# Patient Record
Sex: MALE | Race: WHITE | Employment: FULL TIME | URBAN - METROPOLITAN AREA
[De-identification: names, ages, dates, MRNs, and addresses within clinical notes are randomized per-mention and may not be internally consistent; named-entity substitution may affect disease eponyms.]

---

## 2017-07-30 ENCOUNTER — APPOINTMENT (OUTPATIENT)
Dept: CT IMAGING | Age: 42
End: 2017-07-30
Attending: EMERGENCY MEDICINE
Payer: SELF-PAY

## 2017-07-30 ENCOUNTER — HOSPITAL ENCOUNTER (EMERGENCY)
Age: 42
Discharge: HOME | End: 2017-07-30
Attending: EMERGENCY MEDICINE
Payer: SELF-PAY

## 2017-07-30 VITALS
SYSTOLIC BLOOD PRESSURE: 138 MMHG | BODY MASS INDEX: 24.11 KG/M2 | RESPIRATION RATE: 18 BRPM | WEIGHT: 150 LBS | OXYGEN SATURATION: 98 % | HEIGHT: 66 IN | HEART RATE: 70 BPM | DIASTOLIC BLOOD PRESSURE: 64 MMHG | TEMPERATURE: 97.6 F

## 2017-07-30 DIAGNOSIS — S00.83XA FACIAL CONTUSION, INITIAL ENCOUNTER: ICD-10-CM
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
## 2017-07-30 DIAGNOSIS — S16.1XXA NECK STRAIN, INITIAL ENCOUNTER: ICD-10-CM

## 2017-07-30 DIAGNOSIS — S01.81XA FACIAL LACERATION, INITIAL ENCOUNTER: ICD-10-CM

## 2017-07-30 PROCEDURE — 70486 CT MAXILLOFACIAL W/O DYE: CPT

## 2017-07-30 PROCEDURE — 99284 EMERGENCY DEPT VISIT MOD MDM: CPT | Performed by: EMERGENCY MEDICINE

## 2017-07-30 PROCEDURE — 72125 CT NECK SPINE W/O DYE: CPT

## 2017-07-30 PROCEDURE — 74011250637 HC RX REV CODE- 250/637: Performed by: EMERGENCY MEDICINE

## 2017-07-30 PROCEDURE — 70450 CT HEAD/BRAIN W/O DYE: CPT

## 2017-07-30 RX ORDER — ONDANSETRON 8 MG/1
8 TABLET, ORALLY DISINTEGRATING ORAL
Status: COMPLETED | OUTPATIENT
Start: 2017-07-30 | End: 2017-07-30

## 2017-07-30 RX ORDER — CYCLOBENZAPRINE HCL 10 MG
10 TABLET ORAL
Qty: 21 TAB | Refills: 0 | Status: SHIPPED | OUTPATIENT
Start: 2017-07-30 | End: 2017-09-22

## 2017-07-30 RX ORDER — HYDROCODONE BITARTRATE AND ACETAMINOPHEN 5; 325 MG/1; MG/1
1 TABLET ORAL
Qty: 13 TAB | Refills: 0 | Status: SHIPPED | OUTPATIENT
Start: 2017-07-30 | End: 2017-09-22

## 2017-07-30 RX ORDER — OXYCODONE AND ACETAMINOPHEN 5; 325 MG/1; MG/1
1 TABLET ORAL
Status: COMPLETED | OUTPATIENT
Start: 2017-07-30 | End: 2017-07-30

## 2017-07-30 RX ADMIN — OXYCODONE HYDROCHLORIDE AND ACETAMINOPHEN 1 TABLET: 5; 325 TABLET ORAL at 09:11

## 2017-07-30 RX ADMIN — ONDANSETRON 8 MG: 8 TABLET, ORALLY DISINTEGRATING ORAL at 09:49
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
## 2017-07-30 NOTE — DISCHARGE INSTRUCTIONS
Neck Strain: Care Instructions  Your Care Instructions  You have strained the muscles and ligaments in your neck. A sudden, awkward movement can strain the neck. This often occurs with falls or car accidents or during certain sports. Everyday activities like working on a computer or sleeping can also cause neck strain if they force you to hold your neck in an awkward position for a long time. It is common for neck pain to get worse for a day or two after an injury, but it should start to feel better after that. You may have more pain and stiffness for several days before it gets better. This is expected. It may take a few weeks or longer for it to heal completely. Good home treatment can help you get better faster and avoid future neck problems. Follow-up care is a key part of your treatment and safety. Be sure to make and go to all appointments, and call your doctor if you are having problems. It's also a good idea to know your test results and keep a list of the medicines you take. How can you care for yourself at home? · If you were given a neck brace (cervical collar) to limit neck motion, wear it as instructed for as many days as your doctor tells you to. Do not wear it longer than you were told to. Wearing a brace for too long can make neck stiffness worse and weaken the neck muscles. · You can try using heat or ice to see if it helps. ¨ Try using a heating pad on a low or medium setting for 15 to 20 minutes every 2 to 3 hours. Try a warm shower in place of one session with the heating pad. You can also buy single-use heat wraps that last up to 8 hours. ¨ You can also try an ice pack for 10 to 15 minutes every 2 to 3 hours. · Take pain medicines exactly as directed. ¨ If the doctor gave you a prescription medicine for pain, take it as prescribed. ¨ If you are not taking a prescription pain medicine, ask your doctor if you can take an over-the-counter medicine.   · Gently rub the area to relieve pain and help with blood flow. Do not massage the area if it hurts to do so. · Do not do anything that makes the pain worse. Take it easy for a couple of days. You can do your usual activities if they do not hurt your neck or put it at risk for more stress or injury. · Try sleeping on a special neck pillow. Place it under your neck, not under your head. Placing a tightly rolled-up towel under your neck while you sleep will also work. If you use a neck pillow or rolled towel, do not use your regular pillow at the same time. · To prevent future neck pain, do exercises to stretch and strengthen your neck and back. Learn how to use good posture, safe lifting techniques, and proper body mechanics. When should you call for help? Call 911 anytime you think you may need emergency care. For example, call if:  · You are unable to move an arm or a leg at all. Call your doctor now or seek immediate medical care if:  · You have new or worse symptoms in your arms, legs, chest, belly, or buttocks. Symptoms may include:  ¨ Numbness or tingling. ¨ Weakness. ¨ Pain. · You lose bladder or bowel control. Watch closely for changes in your health, and be sure to contact your doctor if:  · You are not getting better as expected. Where can you learn more? Go to http://jama-norma.info/. Enter M253 in the search box to learn more about \"Neck Strain: Care Instructions. \"  Current as of: March 21, 2017  Content Version: 11.3  © 4029-0095 [employer]. Care instructions adapted under license by [employer] (which disclaims liability or warranty for this information). If you have questions about a medical condition or this instruction, always ask your healthcare professional. [place] [address] any warranty or liability for your use of this information. Head Injury: Care Instructions  Your Care Instructions  Most injuries to the head are minor.  Bumps, cuts, and scrapes on the head and face usually heal well and can be treated the same as injuries to other parts of the body. Although it's rare, once in a while a more serious problem shows up after you are home. So it's good to be on the lookout for symptoms for a day or two. Follow-up care is a key part of your treatment and safety. Be sure to make and go to all appointments, and call your doctor if you are having problems. It's also a good idea to know your test results and keep a list of the medicines you take. How can you care for yourself at home? · Follow your doctor's instructions. He or she will tell you if you need someone to watch you closely for the next 24 hours or longer. · Take it easy for the next few days or more if you are not feeling well. · Ask your doctor when it's okay for you to go back to activities like driving a car, riding a bike, or operating machinery. When should you call for help? Call 911 anytime you think you may need emergency care. For example, call if:  · You have a seizure. · You passed out (lost consciousness). · You are confused or can't stay awake. Call your doctor now or seek immediate medical care if:  · You have new or worse vomiting. · You feel less alert. · You have new weakness or numbness in any part of your body. Watch closely for changes in your health, and be sure to contact your doctor if:  · You do not get better as expected. · You have new symptoms, such as headaches, trouble concentrating, or changes in mood. Where can you learn more? Go to http://jama-norma.info/. Enter F309 in the search box to learn more about \"Head Injury: Care Instructions. \"  Current as of: October 14, 2016  Content Version: 11.3  © 2275-3061 [employer]. Care instructions adapted under license by [employer] (which disclaims liability or warranty for this information).  If you have questions about a medical condition or this instruction, always ask your healthcare professional. [place] [address] any warranty or liability for your use of this information. Cuts: Care Instructions  Your Care Instructions  A cut can happen anywhere on your body. Stitches, staples, skin adhesives, or pieces of tape called Steri-Strips are sometimes used to keep the edges of a cut together and help it heal. Steri-Strips can be used by themselves or with stitches or staples. Sometimes cuts are left open. If the cut went deep and through the skin, the doctor may have closed the cut in two layers. A deeper layer of stitches brings the deep part of the cut together. These stitches will dissolve and don't need to be removed. The upper layer closure, which could be stitches, staples, Steri-Strips, or adhesive, is what you see on the cut. A cut is often covered by a bandage. The doctor has checked you carefully, but problems can develop later. If you notice any problems or new symptoms, get medical treatment right away. Follow-up care is a key part of your treatment and safety. Be sure to make and go to all appointments, and call your doctor if you are having problems. It's also a good idea to know your test results and keep a list of the medicines you take. How can you care for yourself at home? If a cut is open or closed  · Prop up the sore area on a pillow anytime you sit or lie down during the next 3 days. Try to keep it above the level of your heart. This will help reduce swelling. · Keep the cut dry for the first 24 to 48 hours. After this, you can shower if your doctor okays it. Pat the cut dry. · Don't soak the cut, such as in a bathtub. Your doctor will tell you when it's safe to get the cut wet. · After the first 24 to 48 hours, clean the cut with soap and water 2 times a day unless your doctor gives you different instructions. ¨ Don't use hydrogen peroxide or alcohol, which can slow healing.   ¨ You may cover the cut with a thin layer of petroleum jelly and a nonstick bandage. ¨ If the doctor put a bandage over the cut, put on a new bandage after cleaning the cut or if the bandage gets wet or dirty. · Avoid any activity that could cause your cut to reopen. · Be safe with medicines. Read and follow all instructions on the label. ¨ If the doctor gave you a prescription medicine for pain, take it as prescribed. ¨ If you are not taking a prescription pain medicine, ask your doctor if you can take an over-the-counter medicine. If the cut is closed with stitches, staples, or Steri-Strips  · Follow the above instructions for open or closed cuts. · Do not remove the stitches or staples on your own. Your doctor will tell you when to come back to have the stitches or staples removed. · Leave Steri-Strips on until they fall off. If the cut is closed with a skin adhesive  · Follow the above instructions for open or closed cuts. · Leave the skin adhesive on your skin until it falls off on its own. This may take 5 to 10 days. · Do not scratch, rub, or pick at the adhesive. · Do not put the sticky part of a bandage directly on the adhesive. · Do not put any kind of ointment, cream, or lotion over the area. This can make the adhesive fall off too soon. Do not use hydrogen peroxide or alcohol, which can slow healing. When should you call for help? Call your doctor now or seek immediate medical care if:  · You have new pain, or your pain gets worse. · The skin near the cut is cold or pale or changes color. · You have tingling, weakness, or numbness near the cut. · The cut starts to bleed, and blood soaks through the bandage. Oozing small amounts of blood is normal.  · You have trouble moving the area near the cut. · You have symptoms of infection, such as:  ¨ Increased pain, swelling, warmth, or redness around the cut. ¨ Red streaks leading from the cut. ¨ Pus draining from the cut. ¨ A fever.   Watch closely for changes in your health, and be sure to contact your doctor if:  · The cut reopens. · You do not get better as expected. Where can you learn more? Go to http://jama-norma.info/. Enter M735 in the search box to learn more about \"Cuts: Care Instructions. \"  Current as of: March 20, 2017  Content Version: 11.3  © 4777-0899 [employer]. Care instructions adapted under license by [email or web address] (which disclaims liability or warranty for this information). If you have questions about a medical condition or this instruction, always ask your healthcare professional. [address] any warranty or liability for your use of this information. Skin Tears: Care Instructions  Your Care Instructions  As we get older, our skin gets drier and more fragile. Sometimes this can cause the outer layers of skin to split and tear open. Skin tears are treated in different ways. In some cases, doctors use pieces of tape called Steri-Strips to pull the skin together and help it heal. Other times, it's best to leave the tear open and cover it with a special wound-care bandage. Skin tears are usually not serious. They usually heal in a few weeks. But how long you take to heal depends on your body and the type of tear you have. Sometimes the torn piece of skin is used to protect the wound while it heals. But that piece of skin does not heal. It may fall off on its own. Or the doctor may remove it. As your tear heals, it's important to keep it clean to help prevent infection. The doctor has checked you carefully, but problems can develop later. If you notice any problems or new symptoms, get medical treatment right away. Follow-up care is a key part of your treatment and safety. Be sure to make and go to all appointments, and call your doctor if you are having problems. It's also a good idea to know your test results and keep a list of the medicines you take.   How can you care for yourself at home?  · If you have pain, ask your doctor if you can take an over-the-counter pain medicine, such as acetaminophen (Tylenol), ibuprofen (Advil, Motrin), or naproxen (Aleve). Be safe with medicines. Read and follow all instructions on the label. · If you have a bandage, follow your doctor's instructions for changing it. · If you have Steri-Strips, leave them on until they fall off. · Follow your doctor's instructions about bathing. · Gently wash the skin tear with plain water 2 times a day. Do not rub the area. · Let the area air dry. Or you can pat it carefully with a soft towel. When should you call for help? Call your doctor now or seek immediate medical care if:  · You have signs of infection, such as:  ¨ Increased pain, swelling, warmth, or redness around the tear. ¨ Red streaks leading from the tear. ¨ Pus draining from the tear. ¨ A fever. · The tear starts to bleed a lot. Small amounts of blood are normal.  Watch closely for changes in your health, and be sure to contact your doctor if:  · You do not get better as expected. Where can you learn more? Go to http://jama-norma.info/. Enter F000 in the search box to learn more about \"Skin Tears: Care Instructions. \"  Current as of: March 20, 2017  Content Version: 11.3  © 2735-1986 [employer]. Care instructions adapted under license by [employer] (which disclaims liability or warranty for this information). If you have questions about a medical condition or this instruction, always ask your healthcare professional. [place] [address] any warranty or liability for your use of this information.
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
## 2017-07-30 NOTE — ED NOTES
Discharge instructions given verbally and in written form. Pt verbalized understanding of instructions and prescription given. Pt left the ER ambulatory.

## 2017-08-02 ENCOUNTER — HOSPITAL ENCOUNTER (EMERGENCY)
Age: 42
Discharge: LEFT BEFORE DISCHARGE | End: 2017-08-02
Attending: EMERGENCY MEDICINE
Payer: SELF-PAY

## 2017-08-02 VITALS
TEMPERATURE: 98.8 F | DIASTOLIC BLOOD PRESSURE: 74 MMHG | OXYGEN SATURATION: 98 % | WEIGHT: 160 LBS | HEART RATE: 99 BPM | HEIGHT: 66 IN | BODY MASS INDEX: 25.71 KG/M2 | SYSTOLIC BLOOD PRESSURE: 116 MMHG | RESPIRATION RATE: 17 BRPM

## 2017-08-02 PROCEDURE — 75810000275 HC EMERGENCY DEPT VISIT NO LEVEL OF CARE: Performed by: EMERGENCY MEDICINE

## 2017-09-22 PROCEDURE — 99283 EMERGENCY DEPT VISIT LOW MDM: CPT | Performed by: EMERGENCY MEDICINE

## 2017-09-23 ENCOUNTER — HOSPITAL ENCOUNTER (EMERGENCY)
Age: 42
Discharge: HOME | End: 2017-09-23
Attending: EMERGENCY MEDICINE
Payer: SELF-PAY

## 2017-09-23 VITALS
WEIGHT: 180 LBS | TEMPERATURE: 98.3 F | HEART RATE: 80 BPM | DIASTOLIC BLOOD PRESSURE: 93 MMHG | RESPIRATION RATE: 14 BRPM | OXYGEN SATURATION: 96 % | BODY MASS INDEX: 28.93 KG/M2 | HEIGHT: 66 IN | SYSTOLIC BLOOD PRESSURE: 144 MMHG

## 2017-09-23 DIAGNOSIS — G44.219 EPISODIC TENSION-TYPE HEADACHE, NOT INTRACTABLE: Primary | ICD-10-CM

## 2017-09-23 DIAGNOSIS — R11.0 NAUSEA ALONE: ICD-10-CM

## 2017-09-23 DIAGNOSIS — F41.1 ANXIETY STATE: ICD-10-CM

## 2017-09-23 DIAGNOSIS — R42 DIZZINESS: ICD-10-CM

## 2017-09-23 RX ORDER — MECLIZINE HYDROCHLORIDE 25 MG/1
25 TABLET ORAL
Qty: 15 TAB | Refills: 0 | Status: SHIPPED | OUTPATIENT
Start: 2017-09-23 | End: 2017-10-03

## 2017-09-23 RX ORDER — GABAPENTIN 300 MG/1
CAPSULE ORAL
Qty: 90 CAP | Refills: 1 | Status: SHIPPED | OUTPATIENT
Start: 2017-09-23

## 2017-09-23 RX ORDER — PROCHLORPERAZINE MALEATE 10 MG
10 TABLET ORAL
Qty: 8 TAB | Refills: 1 | Status: SHIPPED | OUTPATIENT
Start: 2017-09-23

## 2017-09-23 RX ORDER — BUTALBITAL, ACETAMINOPHEN AND CAFFEINE 300; 40; 50 MG/1; MG/1; MG/1
1-2 CAPSULE ORAL
Qty: 20 CAP | Refills: 0 | Status: SHIPPED | OUTPATIENT
Start: 2017-09-23

## 2017-09-23 NOTE — ED TRIAGE NOTES
Pt c/o pain in skull behind left ear. He also has c/o extreme anxiety. Pt states he has been seen in multiple  EDs for these same complaints recently. Pt a&o with no signs of distress during triage.
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
## 2017-09-23 NOTE — DISCHARGE INSTRUCTIONS
Dizziness: Care Instructions  Your Care Instructions  Dizziness is the feeling of unsteadiness or fuzziness in your head. It is different than having vertigo, which is a feeling that the room is spinning or that you are moving or falling. It is also different from lightheadedness, which is the feeling that you are about to faint. It can be hard to know what causes dizziness. Some people feel dizzy when they have migraine headaches. Sometimes bouts of flu can make you feel dizzy. Some medical conditions, such as heart problems or high blood pressure, can make you feel dizzy. Many medicines can cause dizziness, including medicines for high blood pressure, pain, or anxiety. If a medicine causes your symptoms, your doctor may recommend that you stop or change the medicine. If it is a problem with your heart, you may need medicine to help your heart work better. If there is no clear reason for your symptoms, your doctor may suggest watching and waiting for a while to see if the dizziness goes away on its own. Follow-up care is a key part of your treatment and safety. Be sure to make and go to all appointments, and call your doctor if you are having problems. It's also a good idea to know your test results and keep a list of the medicines you take. How can you care for yourself at home? · If your doctor recommends or prescribes medicine, take it exactly as directed. Call your doctor if you think you are having a problem with your medicine. · Do not drive while you feel dizzy. · Try to prevent falls. Steps you can take include:  ¨ Using nonskid mats, adding grab bars near the tub, and using night-lights. ¨ Clearing your home so that walkways are free of anything you might trip on. ¨ Letting family and friends know that you have been feeling dizzy. This will help them know how to help you. When should you call for help? Call 911 anytime you think you may need emergency care.  For example, call if:  · You passed out (lost consciousness). · You have dizziness along with symptoms of a heart attack. These may include:  ¨ Chest pain or pressure, or a strange feeling in the chest.  ¨ Sweating. ¨ Shortness of breath. ¨ Nausea or vomiting. ¨ Pain, pressure, or a strange feeling in the back, neck, jaw, or upper belly or in one or both shoulders or arms. ¨ Lightheadedness or sudden weakness. ¨ A fast or irregular heartbeat. · You have symptoms of a stroke. These may include:  ¨ Sudden numbness, tingling, weakness, or loss of movement in your face, arm, or leg, especially on only one side of your body. ¨ Sudden vision changes. ¨ Sudden trouble speaking. ¨ Sudden confusion or trouble understanding simple statements. ¨ Sudden problems with walking or balance. ¨ A sudden, severe headache that is different from past headaches. Call your doctor now or seek immediate medical care if:  · You feel dizzy and have a fever, headache, or ringing in your ears. · You have new or increased nausea and vomiting. · Your dizziness does not go away or comes back. Watch closely for changes in your health, and be sure to contact your doctor if:  · You do not get better as expected. Where can you learn more? Go to http://jama-norma.info/. Enter P904 in the search box to learn more about \"Dizziness: Care Instructions. \"  Current as of: March 20, 2017  Content Version: 11.3  © 4802-1009 [employer]. Care instructions adapted under license by [employer] (which disclaims liability or warranty for this information). If you have questions about a medical condition or this instruction, always ask your healthcare professional. [place] [address] any warranty or liability for your use of this information. Anxiety Disorder: Care Instructions  Your Care Instructions  Anxiety is a normal reaction to stress.  Difficult situations can cause you to have symptoms such as sweaty palms and a nervous feeling. In an anxiety disorder, the symptoms are far more severe. Constant worry, muscle tension, trouble sleeping, nausea and diarrhea, and other symptoms can make normal daily activities difficult or impossible. These symptoms may occur for no reason, and they can affect your work, school, or social life. Medicines, counseling, and self-care can all help. Follow-up care is a key part of your treatment and safety. Be sure to make and go to all appointments, and call your doctor if you are having problems. It's also a good idea to know your test results and keep a list of the medicines you take. How can you care for yourself at home? · Take medicines exactly as directed. Call your doctor if you think you are having a problem with your medicine. · Go to your counseling sessions and follow-up appointments. · Recognize and accept your anxiety. Then, when you are in a situation that makes you anxious, say to yourself, \"This is not an emergency. I feel uncomfortable, but I am not in danger. I can keep going even if I feel anxious. \"  · Be kind to your body:  ¨ Relieve tension with exercise or a massage. ¨ Get enough rest.  ¨ Avoid alcohol, caffeine, nicotine, and illegal drugs. They can increase your anxiety level and cause sleep problems. ¨ Learn and do relaxation techniques. See below for more about these techniques. · Engage your mind. Get out and do something you enjoy. Go to a funny movie, or take a walk or hike. Plan your day. Having too much or too little to do can make you anxious. · Keep a record of your symptoms. Discuss your fears with a good friend or family member, or join a support group for people with similar problems. Talking to others sometimes relieves stress. · Get involved in social groups, or volunteer to help others. Being alone sometimes makes things seem worse than they are. · Get at least 30 minutes of exercise on most days of the week to relieve stress.  Walking is a good choice. You also may want to do other activities, such as running, swimming, cycling, or playing tennis or team sports. Relaxation techniques  Do relaxation exercises 10 to 20 minutes a day. You can play soothing, relaxing music while you do them, if you wish. · Tell others in your house that you are going to do your relaxation exercises. Ask them not to disturb you. · Find a comfortable place, away from all distractions and noise. · Lie down on your back, or sit with your back straight. · Focus on your breathing. Make it slow and steady. · Breathe in through your nose. Breathe out through either your nose or mouth. · Breathe deeply, filling up the area between your navel and your rib cage. Breathe so that your belly goes up and down. · Do not hold your breath. · Breathe like this for 5 to 10 minutes. Notice the feeling of calmness throughout your whole body. As you continue to breathe slowly and deeply, relax by doing the following for another 5 to 10 minutes:  · Tighten and relax each muscle group in your body. You can begin at your toes and work your way up to your head. · Imagine your muscle groups relaxing and becoming heavy. · Empty your mind of all thoughts. · Let yourself relax more and more deeply. · Become aware of the state of calmness that surrounds you. · When your relaxation time is over, you can bring yourself back to alertness by moving your fingers and toes and then your hands and feet and then stretching and moving your entire body. Sometimes people fall asleep during relaxation, but they usually wake up shortly afterward. · Always give yourself time to return to full alertness before you drive a car or do anything that might cause an accident if you are not fully alert. Never play a relaxation tape while you drive a car. When should you call for help? Call 911 anytime you think you may need emergency care.  For example, call if:  · You feel you cannot stop from hurting yourself or someone else. Keep the numbers for these national suicide hotlines: 7-070-377-TALK (4-744.849.5828) and 5-526-SBQEMKR (3-441.603.4641). If you or someone you know talks about suicide or feeling hopeless, get help right away. Watch closely for changes in your health, and be sure to contact your doctor if:  · You have anxiety or fear that affects your life. · You have symptoms of anxiety that are new or different from those you had before. Where can you learn more? Go to http://jama-norma.info/. Enter P754 in the search box to learn more about \"Anxiety Disorder: Care Instructions. \"  Current as of: July 26, 2016  Content Version: 11.3  © 3876-5050 [employer]. Care instructions adapted under license by [employer] (which disclaims liability or warranty for this information). If you have questions about a medical condition or this instruction, always ask your healthcare professional. [place] [address] any warranty or liability for your use of this information. Headache: Care Instructions  Your Care Instructions    Headaches have many possible causes. Most headaches aren't a sign of a more serious problem, and they will get better on their own. Home treatment may help you feel better faster. The doctor has checked you carefully, but problems can develop later. If you notice any problems or new symptoms, get medical treatment right away. Follow-up care is a key part of your treatment and safety. Be sure to make and go to all appointments, and call your doctor if you are having problems. It's also a good idea to know your test results and keep a list of the medicines you take. How can you care for yourself at home? · Do not drive if you have taken a prescription pain medicine. · Rest in a quiet, dark room until your headache is gone. Close your eyes and try to relax or go to sleep. Don't watch TV or read.   · Put a cold, moist cloth or cold pack on the painful area for 10 to 20 minutes at a time. Put a thin cloth between the cold pack and your skin. · Use a warm, moist towel or a heating pad set on low to relax tight shoulder and neck muscles. · Have someone gently massage your neck and shoulders. · Take pain medicines exactly as directed. ¨ If the doctor gave you a prescription medicine for pain, take it as prescribed. ¨ If you are not taking a prescription pain medicine, ask your doctor if you can take an over-the-counter medicine. · Be careful not to take pain medicine more often than the instructions allow, because you may get worse or more frequent headaches when the medicine wears off. · Do not ignore new symptoms that occur with a headache, such as a fever, weakness or numbness, vision changes, or confusion. These may be signs of a more serious problem. To prevent headaches  · Keep a headache diary so you can figure out what triggers your headaches. Avoiding triggers may help you prevent headaches. Record when each headache began, how long it lasted, and what the pain was like (throbbing, aching, stabbing, or dull). Write down any other symptoms you had with the headache, such as nausea, flashing lights or dark spots, or sensitivity to bright light or loud noise. Note if the headache occurred near your period. List anything that might have triggered the headache, such as certain foods (chocolate, cheese, wine) or odors, smoke, bright light, stress, or lack of sleep. · Find healthy ways to deal with stress. Headaches are most common during or right after stressful times. Take time to relax before and after you do something that has caused a headache in the past.  · Try to keep your muscles relaxed by keeping good posture. Check your jaw, face, neck, and shoulder muscles for tension, and try relaxing them. When sitting at a desk, change positions often, and stretch for 30 seconds each hour.   · Get plenty of sleep and exercise. · Eat regularly and well. Long periods without food can trigger a headache. · Treat yourself to a massage. Some people find that regular massages are very helpful in relieving tension. · Limit caffeine by not drinking too much coffee, tea, or soda. But don't quit caffeine suddenly, because that can also give you headaches. · Reduce eyestrain from computers by blinking frequently and looking away from the computer screen every so often. Make sure you have proper eyewear and that your monitor is set up properly, about an arm's length away. · Seek help if you have depression or anxiety. Your headaches may be linked to these conditions. Treatment can both prevent headaches and help with symptoms of anxiety or depression. When should you call for help? Call 911 anytime you think you may need emergency care. For example, call if:  · You have signs of a stroke. These may include:  ¨ Sudden numbness, paralysis, or weakness in your face, arm, or leg, especially on only one side of your body. ¨ Sudden vision changes. ¨ Sudden trouble speaking. ¨ Sudden confusion or trouble understanding simple statements. ¨ Sudden problems with walking or balance. ¨ A sudden, severe headache that is different from past headaches. Call your doctor now or seek immediate medical care if:  · You have a new or worse headache. · Your headache gets much worse. Where can you learn more? Go to http://jama-norma.info/. Enter M271 in the search box to learn more about \"Headache: Care Instructions. \"  Current as of: October 14, 2016  Content Version: 11.3  © 5553-7073 "[employer]. Care instructions adapted under license by [employer] (which disclaims liability or warranty for this information).  If you have questions about a medical condition or this instruction, always ask your healthcare professional. [place] [address] any warranty or liability for your use of this information.

## 2018-10-04 ENCOUNTER — HOSPITAL ENCOUNTER (EMERGENCY)
Age: 43
Discharge: HOME | End: 2018-10-04
Attending: EMERGENCY MEDICINE
Payer: SUBSIDIZED

## 2018-10-04 VITALS
BODY MASS INDEX: 27.32 KG/M2 | SYSTOLIC BLOOD PRESSURE: 119 MMHG | HEART RATE: 100 BPM | TEMPERATURE: 98.4 F | OXYGEN SATURATION: 93 % | DIASTOLIC BLOOD PRESSURE: 75 MMHG | RESPIRATION RATE: 18 BRPM | WEIGHT: 170 LBS | HEIGHT: 66 IN

## 2018-10-04 DIAGNOSIS — L03.211 CELLULITIS OF FACE: Primary | ICD-10-CM

## 2018-10-04 PROCEDURE — 99283 EMERGENCY DEPT VISIT LOW MDM: CPT | Performed by: PHYSICIAN ASSISTANT

## 2018-10-04 PROCEDURE — 74011250637 HC RX REV CODE- 250/637: Performed by: EMERGENCY MEDICINE

## 2018-10-04 RX ORDER — SULFAMETHOXAZOLE AND TRIMETHOPRIM 800; 160 MG/1; MG/1
1 TABLET ORAL 2 TIMES DAILY
Qty: 14 TAB | Refills: 0 | Status: SHIPPED | OUTPATIENT
Start: 2018-10-04 | End: 2018-10-11

## 2018-10-04 RX ORDER — IBUPROFEN 800 MG/1
800 TABLET ORAL
Status: COMPLETED | OUTPATIENT
Start: 2018-10-04 | End: 2018-10-04

## 2018-10-04 RX ADMIN — IBUPROFEN 800 MG: 800 TABLET, FILM COATED ORAL at 22:27

## 2018-10-05 ENCOUNTER — APPOINTMENT (OUTPATIENT)
Dept: CT IMAGING | Age: 43
DRG: 603 | End: 2018-10-05
Attending: EMERGENCY MEDICINE
Payer: SUBSIDIZED

## 2018-10-05 ENCOUNTER — HOSPITAL ENCOUNTER (INPATIENT)
Age: 43
LOS: 8 days | Discharge: LEFT BEFORE DISCHARGE | DRG: 603 | End: 2018-10-13
Attending: EMERGENCY MEDICINE | Admitting: INTERNAL MEDICINE
Payer: SUBSIDIZED

## 2018-10-05 DIAGNOSIS — R22.0 FACIAL SWELLING: Primary | ICD-10-CM

## 2018-10-05 PROBLEM — R17 ELEVATED BILIRUBIN: Status: ACTIVE | Noted: 2018-10-05

## 2018-10-05 PROBLEM — Z72.0 TOBACCO USE: Chronic | Status: ACTIVE | Noted: 2018-10-05

## 2018-10-05 PROBLEM — K12.2 ORAL ABSCESS: Status: ACTIVE | Noted: 2018-10-05

## 2018-10-05 PROBLEM — L03.90 CELLULITIS: Status: ACTIVE | Noted: 2018-10-05

## 2018-10-05 LAB
ALBUMIN SERPL-MCNC: 4.3 G/DL (ref 3.5–5)
ALBUMIN/GLOB SERPL: 1.1 {RATIO} (ref 1.2–3.5)
ALP SERPL-CCNC: 72 U/L (ref 50–136)
ALT SERPL-CCNC: 54 U/L (ref 12–65)
AMPHET UR QL SCN: POSITIVE
ANION GAP SERPL CALC-SCNC: 8 MMOL/L (ref 7–16)
AST SERPL-CCNC: 27 U/L (ref 15–37)
BARBITURATES UR QL SCN: NEGATIVE
BASOPHILS # BLD: 0.1 K/UL (ref 0–0.2)
BASOPHILS NFR BLD: 0 % (ref 0–2)
BENZODIAZ UR QL: NEGATIVE
BILIRUB SERPL-MCNC: 1.2 MG/DL (ref 0.2–1.1)
BUN SERPL-MCNC: 15 MG/DL (ref 6–23)
CALCIUM SERPL-MCNC: 8.9 MG/DL (ref 8.3–10.4)
CANNABINOIDS UR QL SCN: NEGATIVE
CHLORIDE SERPL-SCNC: 100 MMOL/L (ref 98–107)
CO2 SERPL-SCNC: 28 MMOL/L (ref 21–32)
COCAINE UR QL SCN: NEGATIVE
CREAT SERPL-MCNC: 1.16 MG/DL (ref 0.8–1.5)
DIFFERENTIAL METHOD BLD: ABNORMAL
EOSINOPHIL # BLD: 0.2 K/UL (ref 0–0.8)
EOSINOPHIL NFR BLD: 1 % (ref 0.5–7.8)
ERYTHROCYTE [DISTWIDTH] IN BLOOD BY AUTOMATED COUNT: 12.6 %
GLOBULIN SER CALC-MCNC: 4 G/DL (ref 2.3–3.5)
GLUCOSE SERPL-MCNC: 96 MG/DL (ref 65–100)
HCT VFR BLD AUTO: 48.3 % (ref 41.1–50.3)
HGB BLD-MCNC: 16.5 G/DL (ref 13.6–17.2)
IMM GRANULOCYTES # BLD: 0.1 K/UL (ref 0–0.5)
IMM GRANULOCYTES NFR BLD AUTO: 0 % (ref 0–5)
LACTATE BLD-SCNC: 0.7 MMOL/L (ref 0.5–1.9)
LYMPHOCYTES # BLD: 2.3 K/UL (ref 0.5–4.6)
LYMPHOCYTES NFR BLD: 13 % (ref 13–44)
MCH RBC QN AUTO: 32.3 PG (ref 26.1–32.9)
MCHC RBC AUTO-ENTMCNC: 34.2 G/DL (ref 31.4–35)
MCV RBC AUTO: 94.5 FL (ref 79.6–97.8)
METHADONE UR QL: NEGATIVE
MONOCYTES # BLD: 1.4 K/UL (ref 0.1–1.3)
MONOCYTES NFR BLD: 8 % (ref 4–12)
NEUTS SEG # BLD: 13.3 K/UL (ref 1.7–8.2)
NEUTS SEG NFR BLD: 77 % (ref 43–78)
NRBC # BLD: 0 K/UL (ref 0–0.2)
OPIATES UR QL: POSITIVE
PCP UR QL: NEGATIVE
PLATELET # BLD AUTO: 244 K/UL (ref 150–450)
PMV BLD AUTO: 9.8 FL (ref 9.4–12.3)
POTASSIUM SERPL-SCNC: 4.1 MMOL/L (ref 3.5–5.1)
PROT SERPL-MCNC: 8.3 G/DL (ref 6.3–8.2)
RBC # BLD AUTO: 5.11 M/UL (ref 4.23–5.6)
SODIUM SERPL-SCNC: 136 MMOL/L (ref 136–145)
WBC # BLD AUTO: 17.3 K/UL (ref 4.3–11.1)

## 2018-10-05 PROCEDURE — 87186 SC STD MICRODIL/AGAR DIL: CPT

## 2018-10-05 PROCEDURE — 80053 COMPREHEN METABOLIC PANEL: CPT

## 2018-10-05 PROCEDURE — 87077 CULTURE AEROBIC IDENTIFY: CPT

## 2018-10-05 PROCEDURE — 74011250636 HC RX REV CODE- 250/636: Performed by: INTERNAL MEDICINE

## 2018-10-05 PROCEDURE — 74011250637 HC RX REV CODE- 250/637: Performed by: INTERNAL MEDICINE

## 2018-10-05 PROCEDURE — 83605 ASSAY OF LACTIC ACID: CPT

## 2018-10-05 PROCEDURE — 74011000258 HC RX REV CODE- 258: Performed by: INTERNAL MEDICINE

## 2018-10-05 PROCEDURE — 74011636320 HC RX REV CODE- 636/320: Performed by: EMERGENCY MEDICINE

## 2018-10-05 PROCEDURE — 87205 SMEAR GRAM STAIN: CPT

## 2018-10-05 PROCEDURE — 74011000250 HC RX REV CODE- 250: Performed by: INTERNAL MEDICINE

## 2018-10-05 PROCEDURE — 99283 EMERGENCY DEPT VISIT LOW MDM: CPT | Performed by: EMERGENCY MEDICINE

## 2018-10-05 PROCEDURE — 87641 MR-STAPH DNA AMP PROBE: CPT

## 2018-10-05 PROCEDURE — 74011000250 HC RX REV CODE- 250: Performed by: EMERGENCY MEDICINE

## 2018-10-05 PROCEDURE — 96365 THER/PROPH/DIAG IV INF INIT: CPT | Performed by: EMERGENCY MEDICINE

## 2018-10-05 PROCEDURE — 87040 BLOOD CULTURE FOR BACTERIA: CPT

## 2018-10-05 PROCEDURE — 85025 COMPLETE CBC W/AUTO DIFF WBC: CPT

## 2018-10-05 PROCEDURE — 65270000029 HC RM PRIVATE

## 2018-10-05 PROCEDURE — 74011250636 HC RX REV CODE- 250/636: Performed by: EMERGENCY MEDICINE

## 2018-10-05 PROCEDURE — 74011000258 HC RX REV CODE- 258: Performed by: EMERGENCY MEDICINE

## 2018-10-05 PROCEDURE — 96375 TX/PRO/DX INJ NEW DRUG ADDON: CPT | Performed by: EMERGENCY MEDICINE

## 2018-10-05 PROCEDURE — 80307 DRUG TEST PRSMV CHEM ANLYZR: CPT

## 2018-10-05 PROCEDURE — 70487 CT MAXILLOFACIAL W/DYE: CPT

## 2018-10-05 RX ORDER — ONDANSETRON 2 MG/ML
4 INJECTION INTRAMUSCULAR; INTRAVENOUS
Status: DISCONTINUED | OUTPATIENT
Start: 2018-10-05 | End: 2018-10-13 | Stop reason: HOSPADM

## 2018-10-05 RX ORDER — ACETAMINOPHEN 325 MG/1
650 TABLET ORAL
Status: DISCONTINUED | OUTPATIENT
Start: 2018-10-05 | End: 2018-10-13 | Stop reason: HOSPADM

## 2018-10-05 RX ORDER — SODIUM CHLORIDE 0.9 % (FLUSH) 0.9 %
5-10 SYRINGE (ML) INJECTION EVERY 8 HOURS
Status: DISCONTINUED | OUTPATIENT
Start: 2018-10-05 | End: 2018-10-13 | Stop reason: HOSPADM

## 2018-10-05 RX ORDER — MORPHINE SULFATE 2 MG/ML
2 INJECTION, SOLUTION INTRAMUSCULAR; INTRAVENOUS
Status: DISCONTINUED | OUTPATIENT
Start: 2018-10-05 | End: 2018-10-13 | Stop reason: HOSPADM

## 2018-10-05 RX ORDER — NALOXONE HYDROCHLORIDE 0.4 MG/ML
0.4 INJECTION, SOLUTION INTRAMUSCULAR; INTRAVENOUS; SUBCUTANEOUS AS NEEDED
Status: DISCONTINUED | OUTPATIENT
Start: 2018-10-05 | End: 2018-10-13 | Stop reason: HOSPADM

## 2018-10-05 RX ORDER — SODIUM CHLORIDE 0.9 % (FLUSH) 0.9 %
5-10 SYRINGE (ML) INJECTION AS NEEDED
Status: DISCONTINUED | OUTPATIENT
Start: 2018-10-05 | End: 2018-10-13 | Stop reason: HOSPADM

## 2018-10-05 RX ORDER — MORPHINE SULFATE 2 MG/ML
4 INJECTION, SOLUTION INTRAMUSCULAR; INTRAVENOUS
Status: COMPLETED | OUTPATIENT
Start: 2018-10-05 | End: 2018-10-05

## 2018-10-05 RX ORDER — IBUPROFEN 200 MG
1 TABLET ORAL DAILY
Status: DISCONTINUED | OUTPATIENT
Start: 2018-10-05 | End: 2018-10-13 | Stop reason: HOSPADM

## 2018-10-05 RX ORDER — SODIUM CHLORIDE 0.9 % (FLUSH) 0.9 %
10 SYRINGE (ML) INJECTION
Status: COMPLETED | OUTPATIENT
Start: 2018-10-05 | End: 2018-10-05

## 2018-10-05 RX ORDER — HYDROCODONE BITARTRATE AND ACETAMINOPHEN 5; 325 MG/1; MG/1
1 TABLET ORAL
Status: DISCONTINUED | OUTPATIENT
Start: 2018-10-05 | End: 2018-10-06

## 2018-10-05 RX ADMIN — Medication 10 ML: at 18:00

## 2018-10-05 RX ADMIN — MORPHINE SULFATE 2 MG: 2 INJECTION, SOLUTION INTRAMUSCULAR; INTRAVENOUS at 23:36

## 2018-10-05 RX ADMIN — SODIUM CHLORIDE 100 ML: 900 INJECTION, SOLUTION INTRAVENOUS at 14:56

## 2018-10-05 RX ADMIN — MORPHINE SULFATE 4 MG: 2 INJECTION, SOLUTION INTRAMUSCULAR; INTRAVENOUS at 15:17

## 2018-10-05 RX ADMIN — SODIUM CHLORIDE 900 MG: 900 INJECTION, SOLUTION INTRAVENOUS at 23:39

## 2018-10-05 RX ADMIN — IOPAMIDOL 100 ML: 755 INJECTION, SOLUTION INTRAVENOUS at 14:56

## 2018-10-05 RX ADMIN — Medication 10 ML: at 21:34

## 2018-10-05 RX ADMIN — HYDROCODONE BITARTRATE AND ACETAMINOPHEN 1 TABLET: 5; 325 TABLET ORAL at 18:21

## 2018-10-05 RX ADMIN — SODIUM CHLORIDE 600 MG: 900 INJECTION, SOLUTION INTRAVENOUS at 15:58

## 2018-10-05 RX ADMIN — Medication 10 ML: at 14:56

## 2018-10-05 RX ADMIN — ACETAMINOPHEN 650 MG: 325 TABLET, FILM COATED ORAL at 18:31

## 2018-10-05 NOTE — ED TRIAGE NOTES
Pt sts\" I was shaving the other day and I had a bump here and here (gestreus to either side of chin) and now it's hurting me real bad and its all swollen\" The point of the pt's chin in reddened and swollen. Pt denies any recent injury to the site. Pt tachycardic in triage. Pt called from edge of property where he was smoking immediately before coming into triage.

## 2018-10-05 NOTE — PROGRESS NOTES
10/05/18 1830 Dual Skin Pressure Injury Assessment Dual Skin Pressure Injury Assessment [facility] Second Care Provider (Based on [facility]) [address] [place] [address]

## 2018-10-05 NOTE — ED PROVIDER NOTES
HPI Comments: Patient is a 42 yo male who returns after being evaluated yesterday and unable to get a ride home or get his medications and states worsening pain and swelling. States chills, unknown if fevers, no chest pain or SOB, no swelling in his mouth or throat, no trouble breathing or swallowing, no further complaints. Patient is a 43 y.o. male presenting with mouth pain. The history is provided by the patient. No  was used. Mouth Pain Pertinent negatives include no fever, no abdominal pain, no diarrhea, no nausea, no vomiting, no headaches, no rhinorrhea, no sore throat, no neck pain, no cough and no rash. History reviewed. No pertinent past medical history. Past Surgical History:  
Procedure Laterality Date  HX HEENT    
 tonsils No family history on file. Social History Social History  Marital status: SINGLE Spouse name: N/A  
 Number of children: N/A  
 Years of education: N/A Occupational History  Not on file. Social History Main Topics  Smoking status: Former Smoker Packs/day: 1.00  Smokeless tobacco: Current User  Alcohol use No  
 Drug use: No  
 Sexual activity: Not on file Other Topics Concern  Not on file Social History Narrative ALLERGIES: Review of patient's allergies indicates no known allergies. Review of Systems Constitutional: Negative for chills and fever. HENT: Positive for facial swelling. Negative for rhinorrhea and sore throat. Eyes: Negative for visual disturbance. Respiratory: Negative for cough and shortness of breath. Cardiovascular: Negative for chest pain and leg swelling. Gastrointestinal: Negative for abdominal pain, diarrhea, nausea and vomiting. Genitourinary: Negative for dysuria. Musculoskeletal: Negative for back pain and neck pain. Skin: Negative for rash. Neurological: Negative for weakness and headaches. Psychiatric/Behavioral: The patient is not nervous/anxious. Vitals:  
 10/05/18 1319 BP: 128/62 Pulse: (!) 101 Resp: 16 Temp: 99.6 °F (37.6 °C) Weight: 72.6 kg (160 lb) Height: 5' 6\" (1.676 m) Physical Exam  
Constitutional: He is oriented to person, place, and time. He appears well-developed and well-nourished. HENT:  
Head: Normocephalic. Right Ear: External ear normal.  
Left Ear: External ear normal.  
Swelling and pain to right chin/lower lip. No difficulty swallowing or handling secretions, no stridor. Mild trismus is noted. Eyes: Conjunctivae and EOM are normal. Pupils are equal, round, and reactive to light. Neck: Normal range of motion. Neck supple. No tracheal deviation present. Cardiovascular: Normal rate, regular rhythm, normal heart sounds and intact distal pulses. No murmur heard. Pulmonary/Chest: Effort normal and breath sounds normal. No respiratory distress. Abdominal: Soft. He exhibits no distension. There is no tenderness. There is no rebound. Musculoskeletal: Normal range of motion. Neurological: He is alert and oriented to person, place, and time. No cranial nerve deficit. Skin: No rash noted. Nursing note and vitals reviewed. MDM Number of Diagnoses or Management Options Facial swelling: new and requires workup Amount and/or Complexity of Data Reviewed Clinical lab tests: ordered and reviewed Tests in the radiology section of CPT®: ordered and reviewed Tests in the medicine section of CPT®: ordered and reviewed Review and summarize past medical records: yes Risk of Complications, Morbidity, and/or Mortality Presenting problems: high Diagnostic procedures: high Management options: high Patient Progress Patient progress: stable ED Course Procedures Recent Results (from the past 12 hour(s)) CBC WITH AUTOMATED DIFF Collection Time: 10/05/18  1:24 PM  
Result Value Ref Range WBC 17.3 (H) 4.3 - 11.1 K/uL  
 RBC 5.11 4.23 - 5.6 M/uL  
 HGB 16.5 13.6 - 17.2 g/dL HCT 48.3 41.1 - 50.3 % MCV 94.5 79.6 - 97.8 FL  
 MCH 32.3 26.1 - 32.9 PG  
 MCHC 34.2 31.4 - 35.0 g/dL  
 RDW 12.6 % PLATELET 061 880 - 628 K/uL MPV 9.8 9.4 - 12.3 FL ABSOLUTE NRBC 0.00 0.0 - 0.2 K/uL  
 DF AUTOMATED NEUTROPHILS 77 43 - 78 % LYMPHOCYTES 13 13 - 44 % MONOCYTES 8 4.0 - 12.0 % EOSINOPHILS 1 0.5 - 7.8 % BASOPHILS 0 0.0 - 2.0 % IMMATURE GRANULOCYTES 0 0.0 - 5.0 %  
 ABS. NEUTROPHILS 13.3 (H) 1.7 - 8.2 K/UL  
 ABS. LYMPHOCYTES 2.3 0.5 - 4.6 K/UL  
 ABS. MONOCYTES 1.4 (H) 0.1 - 1.3 K/UL  
 ABS. EOSINOPHILS 0.2 0.0 - 0.8 K/UL  
 ABS. BASOPHILS 0.1 0.0 - 0.2 K/UL  
 ABS. IMM. GRANS. 0.1 0.0 - 0.5 K/UL METABOLIC PANEL, COMPREHENSIVE Collection Time: 10/05/18  1:24 PM  
Result Value Ref Range Sodium 136 136 - 145 mmol/L Potassium 4.1 3.5 - 5.1 mmol/L Chloride 100 98 - 107 mmol/L  
 CO2 28 21 - 32 mmol/L Anion gap 8 7 - 16 mmol/L Glucose 96 65 - 100 mg/dL BUN 15 6 - 23 MG/DL Creatinine 1.16 0.8 - 1.5 MG/DL  
 GFR est AA >60 >60 ml/min/1.73m2 GFR est non-AA >60 >60 ml/min/1.73m2 Calcium 8.9 8.3 - 10.4 MG/DL Bilirubin, total 1.2 (H) 0.2 - 1.1 MG/DL  
 ALT (SGPT) 54 12 - 65 U/L  
 AST (SGOT) 27 15 - 37 U/L Alk. phosphatase 72 50 - 136 U/L Protein, total 8.3 (H) 6.3 - 8.2 g/dL Albumin 4.3 3.5 - 5.0 g/dL Globulin 4.0 (H) 2.3 - 3.5 g/dL A-G Ratio 1.1 (L) 1.2 - 3.5 POC LACTIC ACID Collection Time: 10/05/18  1:29 PM  
Result Value Ref Range Lactic Acid (POC) 0.7 0.5 - 1.9 mmol/L Ct Maxillofacial W Cont Result Date: 10/5/2018 EXAM:  CT MAXILLOFACIAL W CONT INDICATION:   facial cellulitis vs. abscess. COMPARISON: 7/30/2017. CONTRAST:   100 mL of Isovue-300.  TECHNIQUE:CT of the face was performed following uneventful rapid bolus intravenous administration of contrast.  Brain and bone windows were generated. CT dose reduction was achieved through use of a standardized protocol tailored for this examination and automatic exposure control for dose modulation. FINDINGS: There is extensive subcutaneous edema about the jaw bilaterally, right greater than left, with a small peripherally enhancing fluid collection at the right anterolateral aspect of the mandible, just beneath the skin which measures 1.2 x 1.4 x 2.8 cm. An additional small fluid collection is seen slightly more superior to this, anterior to the mandible measuring 0.9 x 0.4 cm. There is no evidence of osseous involvement. There were a few prominent right level 1 and level 2A lymph nodes which are likely reactive. The airway is symmetric and patent. Visualized portions of the thyroid gland are unremarkable. Normal course and caliber of the visualized vascular structures. Visualized intracranial contents are unremarkable. The paranasal sinuses and mastoid air cells are well aerated and clear. IMPRESSION: Extensive soft tissue edema surrounding the jaw with 2.8 cm subcutaneous fluid collection at the right anterolateral aspect of the mandible. An additional smaller fluid collection sits slightly more superiorly, measuring 0.9 cm. No evidence of associated osseous abnormality Prominent right cervical lymph nodes likely reactive. 42 yo male with facial pain and swelling: 
 
 Elevated WBC, mildly elevated temperature, severe swelling of face with trismus and CT as above concerning for two fluid collections. I discussed patient with Dr. Светлана Nina from ENT for evaluation for drainage vs. IV abx alone, will admit hospitalist for IV abx and further management.

## 2018-10-05 NOTE — ED PROVIDER NOTES
HPI Comments: 71-year-old white male presents with pain and redness and swelling to his chin. States he was grinding some metal the day before symptoms began and some shavings hit him in the chin. He also shaved a couple days ago before the symptoms began. No fever or vomiting. No dental pain. Patient is a 43 y.o. male presenting with facial pain. The history is provided by the patient. Facial Pain Pertinent negatives include no vomiting. No past medical history on file. Past Surgical History:  
Procedure Laterality Date  HX HEENT    
 tonsils No family history on file. Social History Social History  Marital status: SINGLE Spouse name: N/A  
 Number of children: N/A  
 Years of education: N/A Occupational History  Not on file. Social History Main Topics  Smoking status: Former Smoker Packs/day: 1.00  Smokeless tobacco: Current User  Alcohol use No  
 Drug use: No  
 Sexual activity: Not on file Other Topics Concern  Not on file Social History Narrative ALLERGIES: Review of patient's allergies indicates no known allergies. Review of Systems Constitutional: Negative for fever. Respiratory: Negative for shortness of breath. Gastrointestinal: Negative for vomiting. Skin: Positive for rash. Neurological: Negative for headaches. Vitals:  
 10/04/18 2101 BP: 128/71 Pulse: (!) 108 Resp: 18 Temp: 98.4 °F (36.9 °C) SpO2: 94% Weight: 77.1 kg (170 lb) Height: 5' 6\" (1.676 m) Physical Exam  
Constitutional: He appears well-developed and well-nourished. No distress. HENT:  
Head: Normocephalic and atraumatic. Mouth/Throat: Oropharynx is clear and moist. No oropharyngeal exudate. No trismus. No dental tenderness. Chin has diffuse erythema and swelling with several punctate lesions. No palpable foreign bodies.   
Eyes: Conjunctivae are normal. Pupils are equal, round, and reactive to light.  
Neck: Normal range of motion. Neck supple. Cardiovascular: Normal rate. Pulmonary/Chest: Effort normal.  
Lymphadenopathy:  
  He has no cervical adenopathy. Skin: Skin is warm and dry. Psychiatric: He has a normal mood and affect. Nursing note and vitals reviewed. MDM Number of Diagnoses or Management Options Diagnosis management comments: Facial changes concerning for cellulitis or folliculitis. For now we'll treat with Bactrim. ED Course Procedures

## 2018-10-05 NOTE — ED TRIAGE NOTES
Pt states he was here last night for swelling to his face and his ride did not show up and he has been outside all night. Pt states the pain is getting worse.

## 2018-10-05 NOTE — PROGRESS NOTES
TRANSFER - IN REPORT: 
 
Verbal report received from Sotero RN(name) on Lexa Rao  being received from ED(unit) for routine progression of care Report consisted of patients Situation, Background, Assessment and  
Recommendations(SBAR). Information from the following report(s) SBAR, ED Summary and MAR was reviewed with the receiving nurse. Opportunity for questions and clarification was provided. Assessment completed upon patients arrival to unit and care assumed.

## 2018-10-05 NOTE — ED NOTES
I have reviewed discharge instructions with the patient. The patient verbalized understanding. Patient left ED via Discharge Method: ambulatory to Home with self Opportunity for questions and clarification provided. Patient given 1 scripts. To continue your aftercare when you leave the hospital, you may receive an automated call from our care team to check in on how you are doing. This is a free service and part of our promise to provide the best care and service to meet your aftercare needs.  If you have questions, or wish to unsubscribe from this service please call [phone]. Thank you for Choosing our [facility].

## 2018-10-05 NOTE — H&P
Hospitalist H&P Note Admit Date:  10/5/2018  1:56 PM  
Name:  Gretel Lamar Age:  43 y.o. 
:  1975 MRN:  [id number] PCP:  None Treatment Team: Attending Provider: Jeanne Diana MD; Primary Nurse: Lakhwinder Mena RN 
 
HPI:  
 
CC:  Right facial swelling and pain Mr. Tonia Mayo is a 42 yo male with PMH of tobacco use evaluated for 2 days in a row for worsening right facial pain and edema. He was seen in the ED 10-4-18 for same complaints and prescribed bactrim for facial cellulitis that he has failed to take to date. 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 Denies dental issues though he has poor dentition. Has had fever, oral pain and anorexia with nausea/ emesis. CT maxillofacial shows extensive soft tissue edema with 2.8 cm fluid collection right anterolateral mandible and 0.9 cm fluid collection more superiorly. ENT called by Dr. Dennis Fear of ED. 10 systems reviewed and negative except as noted in HPI. - has dyspnea, headache and sweats History reviewed. No pertinent past medical history. Past Surgical History:  
Procedure Laterality Date  HX HEENT    
 tonsils No Known Allergies Social History Substance Use Topics  Smoking status: Former Smoker Packs/day: 1.00  Smokeless tobacco: Current User  Alcohol use No  
  
 family history- HTN Mickeal Rink Immunization History Administered Date(s) Administered  TDAP Vaccine 2012 PTA Medications: 
Prior to Admission Medications Prescriptions Last Dose Informant Patient Reported? Taking? butalbital-acetaminophen-caff (FIORICET) -40 mg per capsule   No No  
Sig: Take 1-2 Caps by mouth every six (6) hours as needed for Pain. Max Daily Amount: 8 Caps.  
gabapentin (NEURONTIN) 300 mg capsule   No No  
Sig: Take one a day for 3 days, Then increase to twice a day for 3 days The increase to three times a day prochlorperazine (COMPAZINE) 10 mg tablet   No No  
Sig: Take 1 Tab by mouth every six (6) hours as needed for Other (as needed for nausea/vomiting/migraine). trimethoprim-sulfamethoxazole (BACTRIM DS) 160-800 mg per tablet   No No  
Sig: Take 1 Tab by mouth two (2) times a day for 7 days. Facility-Administered Medications: None Objective:  
Patient Vitals for the past 24 hrs: 
 Temp Pulse Resp BP  
10/05/18 1706 99 °F (37.2 °C) 95 16 122/76  
10/05/18 1319 99.6 °F (37.6 °C) (!) 101 16 128/62 No intake or output data in the 24 hours ending 10/05/18 1710 Physical Exam: 
General:    Alert. No distress Eyes:   Normal sclera. Extraocular movements intact. PERRLA 
ENT:  Normocephalic, poor dentition, moderate right lower facial edema and right oral edema CV:   RRR. No m/r/g. . No edema Lungs:  CTAB. No wheezing, rhonchi, or rales. Abdomen: Soft, nontender, nondistended. Present BS Extremities: Warm and dry. . 
Neurologic:  grossly intact. Skin:     No rashes or jaundice. Normal coloration Psych:  Normal mood and affect. I reviewed the labs, imaging, EKGs, telemetry, and other studies done this admission. Data Review:  
Recent Results (from the past 24 hour(s)) CBC WITH AUTOMATED DIFF Collection Time: 10/05/18  1:24 PM  
Result Value Ref Range WBC 17.3 (H) 4.3 - 11.1 K/uL  
 RBC 5.11 4.23 - 5.6 M/uL  
 HGB 16.5 13.6 - 17.2 g/dL HCT 48.3 41.1 - 50.3 % MCV 94.5 79.6 - 97.8 FL  
 MCH 32.3 26.1 - 32.9 PG  
 MCHC 34.2 31.4 - 35.0 g/dL  
 RDW 12.6 % PLATELET 038 572 - 130 K/uL MPV 9.8 9.4 - 12.3 FL ABSOLUTE NRBC 0.00 0.0 - 0.2 K/uL  
 DF AUTOMATED NEUTROPHILS 77 43 - 78 % LYMPHOCYTES 13 13 - 44 % MONOCYTES 8 4.0 - 12.0 % EOSINOPHILS 1 0.5 - 7.8 % BASOPHILS 0 0.0 - 2.0 % IMMATURE GRANULOCYTES 0 0.0 - 5.0 %  
 ABS. NEUTROPHILS 13.3 (H) 1.7 - 8.2 K/UL  
 ABS. LYMPHOCYTES 2.3 0.5 - 4.6 K/UL  
 ABS. MONOCYTES 1.4 (H) 0.1 - 1.3 K/UL ABS. EOSINOPHILS 0.2 0.0 - 0.8 K/UL  
 ABS. BASOPHILS 0.1 0.0 - 0.2 K/UL  
 ABS. IMM. GRANS. 0.1 0.0 - 0.5 K/UL METABOLIC PANEL, COMPREHENSIVE Collection Time: 10/05/18  1:24 PM  
Result Value Ref Range Sodium 136 136 - 145 mmol/L Potassium 4.1 3.5 - 5.1 mmol/L Chloride 100 98 - 107 mmol/L  
 CO2 28 21 - 32 mmol/L Anion gap 8 7 - 16 mmol/L Glucose 96 65 - 100 mg/dL BUN 15 6 - 23 MG/DL Creatinine 1.16 0.8 - 1.5 MG/DL  
 GFR est AA >60 >60 ml/min/1.73m2 GFR est non-AA >60 >60 ml/min/1.73m2 Calcium 8.9 8.3 - 10.4 MG/DL Bilirubin, total 1.2 (H) 0.2 - 1.1 MG/DL  
 ALT (SGPT) 54 12 - 65 U/L  
 AST (SGOT) 27 15 - 37 U/L Alk. phosphatase 72 50 - 136 U/L Protein, total 8.3 (H) 6.3 - 8.2 g/dL Albumin 4.3 3.5 - 5.0 g/dL Globulin 4.0 (H) 2.3 - 3.5 g/dL A-G Ratio 1.1 (L) 1.2 - 3.5 POC LACTIC ACID Collection Time: 10/05/18  1:29 PM  
Result Value Ref Range Lactic Acid (POC) 0.7 0.5 - 1.9 mmol/L All Micro Results Procedure Component Value Units Date/Time CULTURE, BLOOD [[id number]] Collected:  10/05/18 1545 Order Status:  Completed Specimen:  Blood from Blood Updated:  10/05/18 1603 CULTURE, BLOOD [[id number]] Collected:  10/05/18 1544 Order Status:  Completed Specimen:  Blood from Blood Updated:  10/05/18 1603 Other Studies: 
Ct Maxillofacial W Cont Result Date: 10/5/2018 EXAM:  CT MAXILLOFACIAL W CONT INDICATION:   facial cellulitis vs. abscess. COMPARISON: 7/30/2017. CONTRAST:   100 mL of Isovue-300. TECHNIQUE:CT of the face was performed following uneventful rapid bolus intravenous administration of contrast.  Brain and bone windows were generated. CT dose reduction was achieved through use of a standardized protocol tailored for this examination and automatic exposure control for dose modulation.  FINDINGS: There is extensive subcutaneous edema about the jaw bilaterally, right greater than left, with a small peripherally enhancing fluid collection at the right anterolateral aspect of the mandible, just beneath the skin which measures 1.2 x 1.4 x 2.8 cm. An additional small fluid collection is seen slightly more superior to this, anterior to the mandible measuring 0.9 x 0.4 cm. There is no evidence of osseous involvement. There were a few prominent right level 1 and level 2A lymph nodes which are likely reactive. The airway is symmetric and patent. Visualized portions of the thyroid gland are unremarkable. Normal course and caliber of the visualized vascular structures. Visualized intracranial contents are unremarkable. The paranasal sinuses and mastoid air cells are well aerated and clear. IMPRESSION: Extensive soft tissue edema surrounding the jaw with 2.8 cm subcutaneous fluid collection at the right anterolateral aspect of the mandible. An additional smaller fluid collection sits slightly more superiorly, measuring 0.9 cm. No evidence of associated osseous abnormality Prominent right cervical lymph nodes likely reactive. Assessment and Plan:  
 
Hospital Problems as of 10/5/2018  Never Reviewed Codes Class Noted - Resolved POA * (Principal)Cellulitis ICD-10-CM: L03.90 ICD-9-CM: 682.9  10/5/2018 - Present Yes Oral abscess ICD-10-CM: K12.2 ICD-9-CM: 528.3  10/5/2018 - Present Yes Elevated bilirubin ICD-10-CM: R17 
ICD-9-CM: 277.4  10/5/2018 - Present Yes Tobacco use (Chronic) ICD-10-CM: Z72.0 ICD-9-CM: 305.1  10/5/2018 - Present Yes · Right facial cellulitis: IV clindamycin, BC x 2, full liquid diet, ENT consult · Tobacco use: needs cessation, add nicotine patch · Elevated total bilirubin: repeat lab Discharge planning:  pending DVT ppx: SCD Code status:  Full Estimated LOS:  Greater than 2 midnights Risk:  high Care plan: Jackie Lacks- [phone] Signed: Emerson Reyez MD

## 2018-10-05 NOTE — ROUTINE PROCESS
TRANSFER - OUT REPORT: 
 
Verbal report given to Cris De La Cruz on Shaheen Hayward  being transferred to room 201 for routine progression of care Report consisted of patients Situation, Background, Assessment and  
Recommendations(SBAR). Information from the following report(s) ED Summary was reviewed with the receiving nurse. Lines:  
Peripheral IV 10/05/18 Left Antecubital (Active) Site Assessment Clean, dry, & intact 10/5/2018  1:24 PM  
Phlebitis Assessment 0 10/5/2018  1:24 PM  
Infiltration Assessment 0 10/5/2018  1:24 PM  
Dressing Status Clean, dry, & intact 10/5/2018  1:24 PM  
Hub Color/Line Status Pink 10/5/2018  1:24 PM  
  
 
Opportunity for questions and clarification was provided. Patient transported with: 
 [employer]

## 2018-10-05 NOTE — DISCHARGE INSTRUCTIONS
Cellulitis: Care Instructions  Your Care Instructions    Cellulitis is a skin infection caused by bacteria, most often strep or staph. It often occurs after a break in the skin from a scrape, cut, bite, or puncture, or after a rash. Cellulitis may be treated without doing tests to find out what caused it. But your doctor may do tests, if needed, to look for a specific bacteria, like methicillin-resistant Staphylococcus aureus (MRSA). The doctor has checked you carefully, but problems can develop later. If you notice any problems or new symptoms, get medical treatment right away. Follow-up care is a key part of your treatment and safety. Be sure to make and go to all appointments, and call your doctor if you are having problems. It's also a good idea to know your test results and keep a list of the medicines you take. How can you care for yourself at home? · Take your antibiotics as directed. Do not stop taking them just because you feel better. You need to take the full course of antibiotics. · Prop up the infected area on pillows to reduce pain and swelling. Try to keep the area above the level of your heart as often as you can. · If your doctor told you how to care for your wound, follow your doctor's instructions. If you did not get instructions, follow this general advice:  ¨ Wash the wound with clean water 2 times a day. Don't use hydrogen peroxide or alcohol, which can slow healing. ¨ You may cover the wound with a thin layer of petroleum jelly, such as Vaseline, and a nonstick bandage. ¨ Apply more petroleum jelly and replace the bandage as needed. · Be safe with medicines. Take pain medicines exactly as directed. ¨ If the doctor gave you a prescription medicine for pain, take it as prescribed. ¨ If you are not taking a prescription pain medicine, ask your doctor if you can take an over-the-counter medicine.   To prevent cellulitis in the future  · Try to prevent cuts, scrapes, or other injuries to your skin. Cellulitis most often occurs where there is a break in the skin. · If you get a scrape, cut, mild burn, or bite, wash the wound with clean water as soon as you can to help avoid infection. Don't use hydrogen peroxide or alcohol, which can slow healing. · If you have swelling in your legs (edema), support stockings and good skin care may help prevent leg sores and cellulitis. · Take care of your feet, especially if you have diabetes or other conditions that increase the risk of infection. Wear shoes and socks. Do not go barefoot. If you have athlete's foot or other skin problems on your feet, talk to your doctor about how to treat them. When should you call for help? Call your doctor now or seek immediate medical care if:    · You have signs that your infection is getting worse, such as:  ¨ Increased pain, swelling, warmth, or redness. ¨ Red streaks leading from the area. ¨ Pus draining from the area. ¨ A fever.     · You get a rash.    Watch closely for changes in your health, and be sure to contact your doctor if:    · You do not get better as expected. Where can you learn more? Go to http://jama-norma.info/. Willa Gar in the search box to learn more about \"Cellulitis: Care Instructions. \"  Current as of: April 18, 2018  Content Version: 11.8  © 3498-5077 [employer]. Care instructions adapted under license by [employer] (which disclaims liability or warranty for this information). If you have questions about a medical condition or this instruction, always ask your healthcare professional. [place] [address] any warranty or liability for your use of this information.

## 2018-10-06 LAB
ALBUMIN SERPL-MCNC: 3.5 G/DL (ref 3.5–5)
ALBUMIN/GLOB SERPL: 0.9 {RATIO} (ref 1.2–3.5)
ALP SERPL-CCNC: 68 U/L (ref 50–136)
ALT SERPL-CCNC: 41 U/L (ref 12–65)
ANION GAP SERPL CALC-SCNC: 8 MMOL/L (ref 7–16)
AST SERPL-CCNC: 15 U/L (ref 15–37)
BASOPHILS # BLD: 0 K/UL (ref 0–0.2)
BASOPHILS NFR BLD: 0 % (ref 0–2)
BILIRUB SERPL-MCNC: 0.6 MG/DL (ref 0.2–1.1)
BUN SERPL-MCNC: 8 MG/DL (ref 6–23)
CALCIUM SERPL-MCNC: 8.6 MG/DL (ref 8.3–10.4)
CHLORIDE SERPL-SCNC: 103 MMOL/L (ref 98–107)
CO2 SERPL-SCNC: 27 MMOL/L (ref 21–32)
CREAT SERPL-MCNC: 0.93 MG/DL (ref 0.8–1.5)
DIFFERENTIAL METHOD BLD: ABNORMAL
EOSINOPHIL # BLD: 0.1 K/UL (ref 0–0.8)
EOSINOPHIL NFR BLD: 1 % (ref 0.5–7.8)
ERYTHROCYTE [DISTWIDTH] IN BLOOD BY AUTOMATED COUNT: 12.4 %
GLOBULIN SER CALC-MCNC: 3.8 G/DL (ref 2.3–3.5)
GLUCOSE SERPL-MCNC: 133 MG/DL (ref 65–100)
HCT VFR BLD AUTO: 46.1 % (ref 41.1–50.3)
HGB BLD-MCNC: 15.8 G/DL (ref 13.6–17.2)
IMM GRANULOCYTES # BLD: 0.1 K/UL (ref 0–0.5)
IMM GRANULOCYTES NFR BLD AUTO: 0 % (ref 0–5)
LYMPHOCYTES # BLD: 2.2 K/UL (ref 0.5–4.6)
LYMPHOCYTES NFR BLD: 12 % (ref 13–44)
MCH RBC QN AUTO: 32 PG (ref 26.1–32.9)
MCHC RBC AUTO-ENTMCNC: 34.3 G/DL (ref 31.4–35)
MCV RBC AUTO: 93.5 FL (ref 79.6–97.8)
MONOCYTES # BLD: 1.4 K/UL (ref 0.1–1.3)
MONOCYTES NFR BLD: 8 % (ref 4–12)
NEUTS SEG # BLD: 14.1 K/UL (ref 1.7–8.2)
NEUTS SEG NFR BLD: 79 % (ref 43–78)
NRBC # BLD: 0 K/UL (ref 0–0.2)
PLATELET # BLD AUTO: 219 K/UL (ref 150–450)
PMV BLD AUTO: 10.2 FL (ref 9.4–12.3)
POTASSIUM SERPL-SCNC: 3.5 MMOL/L (ref 3.5–5.1)
PROT SERPL-MCNC: 7.3 G/DL (ref 6.3–8.2)
RBC # BLD AUTO: 4.93 M/UL (ref 4.23–5.6)
SODIUM SERPL-SCNC: 138 MMOL/L (ref 136–145)
WBC # BLD AUTO: 17.9 K/UL (ref 4.3–11.1)

## 2018-10-06 PROCEDURE — 74011250637 HC RX REV CODE- 250/637: Performed by: INTERNAL MEDICINE

## 2018-10-06 PROCEDURE — 80053 COMPREHEN METABOLIC PANEL: CPT

## 2018-10-06 PROCEDURE — 77030032490 HC SLV COMPR SCD KNE COVD -B

## 2018-10-06 PROCEDURE — 74011000250 HC RX REV CODE- 250: Performed by: INTERNAL MEDICINE

## 2018-10-06 PROCEDURE — 36415 COLL VENOUS BLD VENIPUNCTURE: CPT

## 2018-10-06 PROCEDURE — 85025 COMPLETE CBC W/AUTO DIFF WBC: CPT

## 2018-10-06 PROCEDURE — 65270000029 HC RM PRIVATE

## 2018-10-06 PROCEDURE — 74011250636 HC RX REV CODE- 250/636: Performed by: INTERNAL MEDICINE

## 2018-10-06 PROCEDURE — 87040 BLOOD CULTURE FOR BACTERIA: CPT

## 2018-10-06 PROCEDURE — 74011000258 HC RX REV CODE- 258: Performed by: INTERNAL MEDICINE

## 2018-10-06 RX ORDER — HYDROCODONE BITARTRATE AND ACETAMINOPHEN 5; 325 MG/1; MG/1
2 TABLET ORAL
Status: DISCONTINUED | OUTPATIENT
Start: 2018-10-06 | End: 2018-10-13 | Stop reason: HOSPADM

## 2018-10-06 RX ORDER — IBUPROFEN 400 MG/1
400 TABLET ORAL
Status: DISCONTINUED | OUTPATIENT
Start: 2018-10-06 | End: 2018-10-13 | Stop reason: HOSPADM

## 2018-10-06 RX ADMIN — ACETAMINOPHEN 650 MG: 325 TABLET, FILM COATED ORAL at 00:18

## 2018-10-06 RX ADMIN — HYDROCODONE BITARTRATE AND ACETAMINOPHEN 1 TABLET: 5; 325 TABLET ORAL at 02:41

## 2018-10-06 RX ADMIN — Medication 10 ML: at 14:40

## 2018-10-06 RX ADMIN — Medication 10 ML: at 05:41

## 2018-10-06 RX ADMIN — Medication 10 ML: at 17:29

## 2018-10-06 RX ADMIN — HYDROCODONE BITARTRATE AND ACETAMINOPHEN 1 TABLET: 5; 325 TABLET ORAL at 07:41

## 2018-10-06 RX ADMIN — HYDROCODONE BITARTRATE AND ACETAMINOPHEN 1 TABLET: 5; 325 TABLET ORAL at 07:29

## 2018-10-06 RX ADMIN — MORPHINE SULFATE 2 MG: 2 INJECTION, SOLUTION INTRAMUSCULAR; INTRAVENOUS at 05:38

## 2018-10-06 RX ADMIN — SODIUM CHLORIDE 900 MG: 900 INJECTION, SOLUTION INTRAVENOUS at 07:30

## 2018-10-06 RX ADMIN — SODIUM CHLORIDE 900 MG: 900 INJECTION, SOLUTION INTRAVENOUS at 16:46

## 2018-10-06 RX ADMIN — MORPHINE SULFATE 2 MG: 2 INJECTION, SOLUTION INTRAMUSCULAR; INTRAVENOUS at 14:39

## 2018-10-06 RX ADMIN — HYDROCODONE BITARTRATE AND ACETAMINOPHEN 2 TABLET: 5; 325 TABLET ORAL at 16:49

## 2018-10-06 RX ADMIN — SODIUM CHLORIDE 900 MG: 900 INJECTION, SOLUTION INTRAVENOUS at 23:06

## 2018-10-06 RX ADMIN — Medication 10 ML: at 23:05

## 2018-10-06 RX ADMIN — MORPHINE SULFATE 2 MG: 2 INJECTION, SOLUTION INTRAMUSCULAR; INTRAVENOUS at 10:22

## 2018-10-06 RX ADMIN — IBUPROFEN 400 MG: 400 TABLET ORAL at 18:33

## 2018-10-06 RX ADMIN — MORPHINE SULFATE 2 MG: 2 INJECTION, SOLUTION INTRAMUSCULAR; INTRAVENOUS at 20:19

## 2018-10-06 RX ADMIN — IBUPROFEN 400 MG: 400 TABLET ORAL at 12:04

## 2018-10-06 RX ADMIN — HYDROCODONE BITARTRATE AND ACETAMINOPHEN 2 TABLET: 5; 325 TABLET ORAL at 23:04

## 2018-10-06 NOTE — PROGRESS NOTES
Problem: Falls - Risk of 
Goal: *Absence of Falls Document [facility] Fall Risk and appropriate interventions in the flowsheet. Outcome: Progressing Towards Goal 
Fall Risk Interventions: 
  
 
  
 
Medication Interventions: Patient to call before getting OOB, Teach patient to arise slowly

## 2018-10-06 NOTE — CONSULTS
Pt with 2-3 days of facial swelling and pain after ? 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 Reports swelling has improved greatly during stay in hospital, on abx. Was seen Thurs. And I believe Bactrim was prescribed, but he did not start that medicine. CT scan obtained on return to ER Friday. Reviewed. Exam shows much better facial swelling than CT would have indicated. Redness, firm swelling, and small scabs/pustules are mostly from the lower lip to submental neck. IMP/Rec:  Facial Cellulitis. Consider Staph or Strep (Tanya Commander). Clindamycin is appropriate. I also recommended he use warm compresses QID. After 24hrs of improvement, I would think discharge is reasonable. Confirming that he will use/finish abx therapy as outpatient is key. No surgical intervention or follow up needed.

## 2018-10-06 NOTE — PROGRESS NOTES
Reviewed notes Full code Came to ER yesterday and returns today with facial pain per notes [facility] have followed patient since 2013 Patient works in [employer] faith Will follow Duane Ramesh, staff [address], [id number] [facility] [address]  /   [email or web address]

## 2018-10-06 NOTE — PROGRESS NOTES
END OF SHIFT NOTE: 
 
INTAKE/OUTPUT 
10/05 0701 - 10/06 0700 In: -  
[facility] 275 [id number] Voiding: YES Catheter: NO 
Drain:   
 
 
 
 
 
Flatus: Patient does have flatus present. Stool:  0 occurrences. Characteristics: 
  
 
Emesis: 0 occurrences. Characteristics: VITAL SIGNS Patient Vitals for the past 12 hrs: 
 Temp Pulse Resp BP SpO2  
10/06/18 0300 98.7 °F (37.1 °C) 74 18 137/90 97 % 10/06/18 0013 98.8 °F (37.1 °C) 91 18 145/85 98 % 10/05/18 1909 99.5 °F (37.5 °C) 98 16 128/77 98 % Pain Assessment Pain Intensity 1: 10 (10/06/18 0538) Pain Location 1: Face Pain Intervention(s) 1: Medication (see MAR) Patient Stated Pain Goal: 0 Ambulating Yes Shift report given to oncoming nurse at the bedside.  
 
Jose Daniel Kirby RN

## 2018-10-06 NOTE — PROGRESS NOTES
Progress Note Patient: Ciara Petersen MRN: [id number]  SSN: [id number] YOB: 1975  Age: 43 y.o. Sex: male Admit Date: 10/5/2018 LOS: 1 day Subjective:  
 
Patient is feeling better. Not as hurt on the face as yesterday. He had a little bleeding from the lesion underneath his right side of the lips area. No fever. No shaking. No chills. No chest pain. No shortness of breath. Objective:  
 
Vitals:  
 10/05/18 1909 10/06/18 0013 10/06/18 0300 10/06/18 6583 BP: 128/77 145/85 137/90 (!) 143/94 Pulse: 98 91 74 85 Resp: 16 18 18 18 Temp: 99.5 °F (37.5 °C) 98.8 °F (37.1 °C) 98.7 °F (37.1 °C) 99.5 °F (37.5 °C) SpO2: 98% 98% 97% 99% Weight:      
Height:      
  
 
Intake and Output: 
Current Shift:   
Last three shifts: 10/04 1901 - 10/06 0700 In: -  
Out: 237 [WHWAH:842] Physical Exam:  
 
General:                    The patient is a pleasant male in no acute respiratory distress. less swollen of the lower area of the face. Head:                                   less swollen at the right lower area of face and less warmth at chin and right lower mandibular area. Eyes:                                   No palpebral pallor or scleral icterus. ENT:                                    External auricular and nasal exam within normal limits. Mucous membranes are moist. 
Neck:                                   Supple, non-tender, no JVD. Lungs:                       Clear to auscultation bilaterally without wheezes or crackles. No respiratory distress or accessory muscle use. Heart:                                  Regular rate and rhythm, without murmurs, rubs, or gallops. Abdomen:                  Soft, non-tender, non-distended with normoactive bowel sounds. Genitourinary:           No tenderness over the bladder or bilateral CVAs. Extremities:               Without clubbing, cyanosis, or edema. Skin:                                    Normal color, texture, and turgor. No rashes, lesions, or jaundice. Pulses:                      Radial and dorsalis pedis pulses present 2+ bilaterally. Capillary refill <2s. Neurologic:                CN II-XII grossly intact and symmetrical.  
                                            Moving all four extremities well with no focal deficits. Psychiatric:                Pleasant demeanor, appropriate affect. Alert and oriented x 3 Lab/Data Review: 
 
Recent Results (from the past 24 hour(s)) CBC WITH AUTOMATED DIFF Collection Time: 10/05/18  1:24 PM  
Result Value Ref Range WBC 17.3 (H) 4.3 - 11.1 K/uL  
 RBC 5.11 4.23 - 5.6 M/uL  
 HGB 16.5 13.6 - 17.2 g/dL HCT 48.3 41.1 - 50.3 % MCV 94.5 79.6 - 97.8 FL  
 MCH 32.3 26.1 - 32.9 PG  
 MCHC 34.2 31.4 - 35.0 g/dL  
 RDW 12.6 % PLATELET 098 260 - 706 K/uL MPV 9.8 9.4 - 12.3 FL ABSOLUTE NRBC 0.00 0.0 - 0.2 K/uL  
 DF AUTOMATED NEUTROPHILS 77 43 - 78 % LYMPHOCYTES 13 13 - 44 % MONOCYTES 8 4.0 - 12.0 % EOSINOPHILS 1 0.5 - 7.8 % BASOPHILS 0 0.0 - 2.0 % IMMATURE GRANULOCYTES 0 0.0 - 5.0 %  
 ABS. NEUTROPHILS 13.3 (H) 1.7 - 8.2 K/UL  
 ABS. LYMPHOCYTES 2.3 0.5 - 4.6 K/UL  
 ABS. MONOCYTES 1.4 (H) 0.1 - 1.3 K/UL  
 ABS. EOSINOPHILS 0.2 0.0 - 0.8 K/UL  
 ABS. BASOPHILS 0.1 0.0 - 0.2 K/UL  
 ABS. IMM. GRANS. 0.1 0.0 - 0.5 K/UL METABOLIC PANEL, COMPREHENSIVE Collection Time: 10/05/18  1:24 PM  
Result Value Ref Range Sodium 136 136 - 145 mmol/L Potassium 4.1 3.5 - 5.1 mmol/L Chloride 100 98 - 107 mmol/L  
 CO2 28 21 - 32 mmol/L Anion gap 8 7 - 16 mmol/L Glucose 96 65 - 100 mg/dL BUN 15 6 - 23 MG/DL Creatinine 1.16 0.8 - 1.5 MG/DL  
 GFR est AA >60 >60 ml/min/1.73m2 GFR est non-AA >60 >60 ml/min/1.73m2  Calcium 8.9 8.3 - 10.4 MG/DL  
 Bilirubin, total 1.2 (H) 0.2 - 1.1 MG/DL  
 ALT (SGPT) 54 12 - 65 U/L  
 AST (SGOT) 27 15 - 37 U/L Alk. phosphatase 72 50 - 136 U/L Protein, total 8.3 (H) 6.3 - 8.2 g/dL Albumin 4.3 3.5 - 5.0 g/dL Globulin 4.0 (H) 2.3 - 3.5 g/dL A-G Ratio 1.1 (L) 1.2 - 3.5 POC LACTIC ACID Collection Time: 10/05/18  1:29 PM  
Result Value Ref Range Lactic Acid (POC) 0.7 0.5 - 1.9 mmol/L  
CULTURE, BLOOD Collection Time: 10/05/18  3:45 PM  
Result Value Ref Range Special Requests: RIGHT Antecubital 
    
 GRAM STAIN GRAM POSITIVE RODS    
 GRAM STAIN ANAEROBIC BOTTLE POSITIVE    
 GRAM STAIN     
  RESULTS VERIFIED, PHONED TO AND READ BACK BY [facility] RN AT 1949 ON 10/6/18 SG  
 Culture result: CULTURE IN PROGRESS,FURTHER UPDATES TO FOLLOW DRUG SCREEN, URINE Collection Time: 10/05/18  9:38 PM  
Result Value Ref Range PCP(PHENCYCLIDINE) NEGATIVE BENZODIAZEPINES NEGATIVE     
 COCAINE NEGATIVE AMPHETAMINES POSITIVE METHADONE NEGATIVE     
 THC (TH-CANNABINOL) NEGATIVE     
 OPIATES POSITIVE    
 BARBITURATES NEGATIVE METABOLIC PANEL, COMPREHENSIVE Collection Time: 10/06/18  4:31 AM  
Result Value Ref Range Sodium 138 136 - 145 mmol/L Potassium 3.5 3.5 - 5.1 mmol/L Chloride 103 98 - 107 mmol/L  
 CO2 27 21 - 32 mmol/L Anion gap 8 7 - 16 mmol/L Glucose 133 (H) 65 - 100 mg/dL BUN 8 6 - 23 MG/DL Creatinine 0.93 0.8 - 1.5 MG/DL  
 GFR est AA >60 >60 ml/min/1.73m2 GFR est non-AA >60 >60 ml/min/1.73m2 Calcium 8.6 8.3 - 10.4 MG/DL Bilirubin, total 0.6 0.2 - 1.1 MG/DL  
 ALT (SGPT) 41 12 - 65 U/L  
 AST (SGOT) 15 15 - 37 U/L Alk. phosphatase 68 50 - 136 U/L Protein, total 7.3 6.3 - 8.2 g/dL Albumin 3.5 3.5 - 5.0 g/dL Globulin 3.8 (H) 2.3 - 3.5 g/dL A-G Ratio 0.9 (L) 1.2 - 3.5    
CBC WITH AUTOMATED DIFF Collection Time: 10/06/18  4:31 AM  
Result Value Ref Range WBC 17.9 (H) 4.3 - 11.1 K/uL  
 RBC 4.93 4.23 - 5.6 M/uL HGB 15.8 13.6 - 17.2 g/dL HCT 46.1 41.1 - 50.3 % MCV 93.5 79.6 - 97.8 FL  
 MCH 32.0 26.1 - 32.9 PG  
 MCHC 34.3 31.4 - 35.0 g/dL  
 RDW 12.4 % PLATELET 307 000 - 843 K/uL MPV 10.2 9.4 - 12.3 FL ABSOLUTE NRBC 0.00 0.0 - 0.2 K/uL  
 DF AUTOMATED NEUTROPHILS 79 (H) 43 - 78 % LYMPHOCYTES 12 (L) 13 - 44 % MONOCYTES 8 4.0 - 12.0 % EOSINOPHILS 1 0.5 - 7.8 % BASOPHILS 0 0.0 - 2.0 % IMMATURE GRANULOCYTES 0 0.0 - 5.0 %  
 ABS. NEUTROPHILS 14.1 (H) 1.7 - 8.2 K/UL  
 ABS. LYMPHOCYTES 2.2 0.5 - 4.6 K/UL  
 ABS. MONOCYTES 1.4 (H) 0.1 - 1.3 K/UL  
 ABS. EOSINOPHILS 0.1 0.0 - 0.8 K/UL  
 ABS. BASOPHILS 0.0 0.0 - 0.2 K/UL  
 ABS. IMM. GRANS. 0.1 0.0 - 0.5 K/UL All Micro Results Procedure Component Value Units Date/Time CULTURE, BLOOD [[id number]] Collected:  10/05/18 1545 Order Status:  Completed Specimen:  Blood from Blood Updated:  10/06/18 0900 Special Requests: --     
  RIGHT Antecubital 
  
  GRAM STAIN GRAM POSITIVE RODS ANAEROBIC BOTTLE POSITIVE RESULTS VERIFIED, PHONED TO AND READ BACK BY [address] [place] [address] RN AT 3846 ON 10/6/18 SG  
  Culture result:      
  CULTURE IN PROGRESS,FURTHER UPDATES TO FOLLOW CULTURE, BLOOD [[id number]] Collected:  10/05/18 1544 Order Status:  Completed Specimen:  Blood from Blood Updated:  10/05/18 1603 Blood culture is positive for MRSA too. CT maxillofacial  
10/5/2018 IMPRESSION: Extensive soft tissue edema surrounding the jaw with 2.8 cm 
subcutaneous fluid collection at the right anterolateral aspect of the mandible. An additional smaller fluid collection sits slightly more superiorly, measuring 
0.9 cm. No evidence of associated osseous abnormality 
  
Prominent right cervical lymph nodes likely reactive.  
 
 
Current Facility-Administered Medications:  
  vancomycin (VANCOCIN) 1750 mg in  ml infusion, 1,750 mg, IntraVENous, ONCE, Doreen Steele MD, Last Rate: 250 mL/hr at 10/07/18 0723, 1,750 mg at 10/07/18 0723 
  vancomycin (VANCOCIN) 750 mg in  ml infusion, 750 mg, IntraVENous, Q8H, Paula Greenfield MD 
  HYDROcodone-acetaminophen (NORCO) 5-325 mg per tablet 2 Tab, 2 Tab, Oral, Q6H PRN, Danial Fink MD, 2 Tab at 10/07/18 8175   ibuprofen (MOTRIN) tablet 400 mg, 400 mg, Oral, Q6H PRN, lEicia Ospina MD, 400 mg at 10/07/18 0339 
  sodium chloride (NS) flush 5-10 mL, 5-10 mL, IntraVENous, Q8H, Gia Corona MD, 10 mL at 10/07/18 0617 
  sodium chloride (NS) flush 5-10 mL, 5-10 mL, IntraVENous, PRN, Gia Corona MD, 10 mL at 10/06/18 1729   acetaminophen (TYLENOL) tablet 650 mg, 650 mg, Oral, Q6H PRN, Gia Corona MD, 650 mg at 10/06/18 0018   morphine injection 2 mg, 2 mg, IntraVENous, Q4H PRN, Gia Corona MD, 2 mg at 10/07/18 0339 
  naloxone (NARCAN) injection 0.4 mg, 0.4 mg, IntraVENous, PRN, Gia Corona MD 
  ondansetron (ZOFRAN) injection 4 mg, 4 mg, IntraVENous, Q4H PRN, Gia Corona MD 
  clindamycin phosphate (CLEOCIN) 900 mg in 0.9% sodium chloride 100 mL IVPB, 900 mg, IntraVENous, Q8H, Gia Corona MD, Last Rate: 200 mL/hr at 10/06/18 2306, 900 mg at 10/06/18 2306   nicotine (NICODERM CQ) 21 mg/24 hr patch 1 Patch, 1 Patch, TransDERmal, DAILY, Gia Corona MD, 1 Patch at 10/06/18 5115   influenza vaccine 2018-19 (6 mos+)(PF) (FLUARIX QUAD/FLULAVAL QUAD) injection 0.5 mL, 0.5 mL, IntraMUSCular, PRIOR TO DISCHARGE, Vinh Ureña MD 
 
  
 
Assessment:  
 
Principal Problem: 
  Cellulitis (10/5/2018) Active Problems: 
  Oral abscess (10/5/2018) Elevated bilirubin (10/5/2018) Tobacco use (10/5/2018) Plan:  
 
Facial cellulitis, with MRSA and anaerobe bacteremia Continue with Vancomycin, and start Zosyn for anaerobe coverage. Continue IV fluid. ENT consultant saw patient, no intervention is needed so far. Pain control. Monitor. Advised him about skin hygiene. Tobacco use 
methamphetamine use I advised patient to quit. Elevated bilirubin Resolved I have discussed the plan of care with patient. DVT prophylaxis : SCD Signed By: Meggan Celis MD   
 October 6, 2018

## 2018-10-07 PROCEDURE — 74011250636 HC RX REV CODE- 250/636: Performed by: INTERNAL MEDICINE

## 2018-10-07 PROCEDURE — 74011000258 HC RX REV CODE- 258: Performed by: INTERNAL MEDICINE

## 2018-10-07 PROCEDURE — 74011250637 HC RX REV CODE- 250/637: Performed by: INTERNAL MEDICINE

## 2018-10-07 PROCEDURE — 65270000029 HC RM PRIVATE

## 2018-10-07 RX ORDER — VANCOMYCIN 1.75 GRAM/500 ML IN 0.9 % SODIUM CHLORIDE INTRAVENOUS
1750 ONCE
Status: COMPLETED | OUTPATIENT
Start: 2018-10-07 | End: 2018-10-07

## 2018-10-07 RX ADMIN — HYDROCODONE BITARTRATE AND ACETAMINOPHEN 2 TABLET: 5; 325 TABLET ORAL at 11:33

## 2018-10-07 RX ADMIN — IBUPROFEN 400 MG: 400 TABLET ORAL at 03:39

## 2018-10-07 RX ADMIN — MORPHINE SULFATE 2 MG: 2 INJECTION, SOLUTION INTRAMUSCULAR; INTRAVENOUS at 19:21

## 2018-10-07 RX ADMIN — MORPHINE SULFATE 2 MG: 2 INJECTION, SOLUTION INTRAMUSCULAR; INTRAVENOUS at 03:39

## 2018-10-07 RX ADMIN — Medication 10 ML: at 13:58

## 2018-10-07 RX ADMIN — MORPHINE SULFATE 2 MG: 2 INJECTION, SOLUTION INTRAMUSCULAR; INTRAVENOUS at 09:55

## 2018-10-07 RX ADMIN — HYDROCODONE BITARTRATE AND ACETAMINOPHEN 2 TABLET: 5; 325 TABLET ORAL at 17:12

## 2018-10-07 RX ADMIN — Medication 10 ML: at 06:17

## 2018-10-07 RX ADMIN — IBUPROFEN 400 MG: 400 TABLET ORAL at 19:23

## 2018-10-07 RX ADMIN — VANCOMYCIN HYDROCHLORIDE 1750 MG: 10 INJECTION, POWDER, LYOPHILIZED, FOR SOLUTION INTRAVENOUS at 07:23

## 2018-10-07 RX ADMIN — PIPERACILLIN SODIUM,TAZOBACTAM SODIUM 3.38 G: 3; .375 INJECTION, POWDER, FOR SOLUTION INTRAVENOUS at 17:12

## 2018-10-07 RX ADMIN — HYDROCODONE BITARTRATE AND ACETAMINOPHEN 2 TABLET: 5; 325 TABLET ORAL at 06:15

## 2018-10-07 RX ADMIN — PIPERACILLIN SODIUM,TAZOBACTAM SODIUM 3.38 G: 3; .375 INJECTION, POWDER, FOR SOLUTION INTRAVENOUS at 09:46

## 2018-10-07 RX ADMIN — VANCOMYCIN HYDROCHLORIDE 750 MG: 10 INJECTION, POWDER, LYOPHILIZED, FOR SOLUTION INTRAVENOUS at 17:12

## 2018-10-07 RX ADMIN — MORPHINE SULFATE 2 MG: 2 INJECTION, SOLUTION INTRAMUSCULAR; INTRAVENOUS at 15:05

## 2018-10-07 NOTE — PROGRESS NOTES
Problem: Falls - Risk of 
Goal: *Absence of Falls Document Fortunato Romo Fall Risk and appropriate interventions in the flowsheet. Outcome: Progressing Towards Goal 
Fall Risk Interventions: 
  
 
  
 
Medication Interventions: Patient to call before getting OOB, Teach patient to arise slowly

## 2018-10-07 NOTE — PROGRESS NOTES
Pharmacokinetic Consult to Pharmacist 
 
Jose Smita is a 43 y.o. male being treated for SSTI with vancomycin. Height: 5' 6\" (167.6 cm)  Weight: 72.6 kg (160 lb) Lab Results Component Value Date/Time BUN 8 10/06/2018 04:31 AM  
 Creatinine 0.93 10/06/2018 04:31 AM  
 WBC 17.9 (H) 10/06/2018 04:31 AM  
 Lactic Acid (POC) 0.7 10/05/2018 01:29 PM  
  
Estimated Creatinine Clearance: 93.4 mL/min (based on Cr of 0.93). CULTURES: 
All Micro Results Procedure Component Value Units Date/Time CULTURE, BLOOD [[id number]] Collected:  10/05/18 1543 Order Status:  Completed Specimen:  Blood from Blood Updated:  10/07/18 8740 Special Requests: --     
  LEFT Antecubital 
  
  GRAM STAIN GRAM POSITIVE COCCI ANAEROBIC BOTTLE POSITIVE RESULTS VERIFIED, PHONED TO AND READ BACK BY Tico Ferrsi RN AT 4094 ON 10/6/18   
  Culture result:      
  CULTURE IN PROGRESS,FURTHER UPDATES TO FOLLOW CULTURE, BLOOD [[id number]]  (Abnormal) Collected:  10/05/18 1545 Order Status:  Completed Specimen:  Blood from Blood Updated:  10/07/18 2707 Special Requests: --     
  RIGHT Antecubital 
  
  GRAM STAIN GRAM POSITIVE RODS ANAEROBIC BOTTLE POSITIVE RESULTS VERIFIED, PHONED TO AND READ BACK BY Juliane WILD RN AT 8210 ON 10/6/18   
        
  GRAM POS COCCI IN CLUSTERS  
   AEROBIC BOTTLE POSITIVE RESULTS VERIFIED, PHONED TO AND READ BACK BY EMILY HARDIN @ 2370 ON [id number] BY Apurva Mcnulty Culture result:    
  [address] [facility] ANAEROBIC BOTTLE POSITIVE 
 (A) CULTURE IN PROGRESS,FURTHER UPDATES TO FOLLOW MRSA target DNA sequences detected:  SA target DNA sequence detected within the sample. Test performed by PCR   Culture/Sensitivity to follow (A) RESULTS VERIFIED, PHONED TO AND READ BACK BY 
Savana Foote ON 10/07/18 @0637, ADS 
  
 CULTURE, BLOOD [[id number]] Collected:  10/06/18 1017 Order Status:  Completed Specimen:  Blood from Blood Updated:  10/06/18 1045 Day 1 of vancomycin. Goal trough is 10-20. Vancomycin dose initiated at 1,750 mg load, followed by 750 mg q8h. Will continue to follow patient. Thank you, Imtiaz Mathis, FlaviaD

## 2018-10-07 NOTE — PROGRESS NOTES
END OF SHIFT NOTE: 
 
INTAKE/OUTPUT 
10/06 0701 - 10/07 0700 In: 120 [I.V.:120] Out: 3300 [DAUMZ:6135] Voiding: YES Catheter: NO 
Drain:   
 
 
 
 
 
Flatus: Patient does have flatus present. Stool:  0 occurrences. Characteristics: 
  
 
Emesis: 0 occurrences. Characteristics: VITAL SIGNS Patient Vitals for the past 12 hrs: 
 Temp Pulse Resp BP SpO2  
10/07/18 0340 99.4 °F (37.4 °C) 83 18 134/72 98 % 10/06/18 2304 98.6 °F (37 °C) 71 18 135/84 97 % 10/06/18 1920 99.1 °F (37.3 °C) 70 18 (!) 134/91 100 % Pain Assessment Pain Intensity 1: 6 (10/07/18 0615) Pain Location 1: Face Pain Intervention(s) 1: Medication (see MAR) Patient Stated Pain Goal: 0 Ambulating Yes Shift report given to oncoming nurse at the bedside.  
 
Amari Rangel RN

## 2018-10-07 NOTE — PROGRESS NOTES
Problem: Falls - Risk of 
Goal: *Absence of Falls Document Hung Levels Fall Risk and appropriate interventions in the flowsheet. Outcome: Progressing Towards Goal 
Fall Risk Interventions: 
  
 
  
 
Medication Interventions: Patient to call before getting OOB, Teach patient to arise slowly

## 2018-10-07 NOTE — PROGRESS NOTES
Clindamycin phosphate late r/t pharmacy not having med ready. This nurse has called twice to check the status of the medication. Will continue to follow up with pharmacy. This order now discontinued and pt started on piperacillin.

## 2018-10-07 NOTE — PROGRESS NOTES
Notified by RN regarding bld Cx positive for MRSA. Will place on [facility] pharmacy to dose.

## 2018-10-08 LAB
ANION GAP SERPL CALC-SCNC: 8 MMOL/L (ref 7–16)
BASOPHILS # BLD: 0.1 K/UL (ref 0–0.2)
BASOPHILS NFR BLD: 1 % (ref 0–2)
BUN SERPL-MCNC: 6 MG/DL (ref 6–23)
CALCIUM SERPL-MCNC: 8.8 MG/DL (ref 8.3–10.4)
CHLORIDE SERPL-SCNC: 106 MMOL/L (ref 98–107)
CO2 SERPL-SCNC: 25 MMOL/L (ref 21–32)
CREAT SERPL-MCNC: 0.76 MG/DL (ref 0.8–1.5)
DIFFERENTIAL METHOD BLD: ABNORMAL
EOSINOPHIL # BLD: 0.3 K/UL (ref 0–0.8)
EOSINOPHIL NFR BLD: 2 % (ref 0.5–7.8)
ERYTHROCYTE [DISTWIDTH] IN BLOOD BY AUTOMATED COUNT: 12.1 %
GLUCOSE SERPL-MCNC: 94 MG/DL (ref 65–100)
HCT VFR BLD AUTO: 45.5 % (ref 41.1–50.3)
HGB BLD-MCNC: 15.3 G/DL (ref 13.6–17.2)
IMM GRANULOCYTES # BLD: 0.1 K/UL (ref 0–0.5)
IMM GRANULOCYTES NFR BLD AUTO: 1 % (ref 0–5)
LYMPHOCYTES # BLD: 2.6 K/UL (ref 0.5–4.6)
LYMPHOCYTES NFR BLD: 20 % (ref 13–44)
MCH RBC QN AUTO: 31.6 PG (ref 26.1–32.9)
MCHC RBC AUTO-ENTMCNC: 33.6 G/DL (ref 31.4–35)
MCV RBC AUTO: 94 FL (ref 79.6–97.8)
MONOCYTES # BLD: 1 K/UL (ref 0.1–1.3)
MONOCYTES NFR BLD: 8 % (ref 4–12)
NEUTS SEG # BLD: 9.1 K/UL (ref 1.7–8.2)
NEUTS SEG NFR BLD: 70 % (ref 43–78)
NRBC # BLD: 0 K/UL (ref 0–0.2)
PLATELET # BLD AUTO: 251 K/UL (ref 150–450)
PMV BLD AUTO: 10.7 FL (ref 9.4–12.3)
POTASSIUM SERPL-SCNC: 4.1 MMOL/L (ref 3.5–5.1)
RBC # BLD AUTO: 4.84 M/UL (ref 4.23–5.6)
SODIUM SERPL-SCNC: 139 MMOL/L (ref 136–145)
WBC # BLD AUTO: 13.1 K/UL (ref 4.3–11.1)

## 2018-10-08 PROCEDURE — 85025 COMPLETE CBC W/AUTO DIFF WBC: CPT

## 2018-10-08 PROCEDURE — 93306 TTE W/DOPPLER COMPLETE: CPT

## 2018-10-08 PROCEDURE — 74011250636 HC RX REV CODE- 250/636: Performed by: INTERNAL MEDICINE

## 2018-10-08 PROCEDURE — 74011000258 HC RX REV CODE- 258: Performed by: INTERNAL MEDICINE

## 2018-10-08 PROCEDURE — 74011250637 HC RX REV CODE- 250/637: Performed by: INTERNAL MEDICINE

## 2018-10-08 PROCEDURE — 36415 COLL VENOUS BLD VENIPUNCTURE: CPT

## 2018-10-08 PROCEDURE — 65270000029 HC RM PRIVATE

## 2018-10-08 PROCEDURE — 80048 BASIC METABOLIC PNL TOTAL CA: CPT

## 2018-10-08 RX ADMIN — Medication 10 ML: at 21:14

## 2018-10-08 RX ADMIN — IBUPROFEN 400 MG: 400 TABLET ORAL at 12:16

## 2018-10-08 RX ADMIN — VANCOMYCIN HYDROCHLORIDE 750 MG: 10 INJECTION, POWDER, LYOPHILIZED, FOR SOLUTION INTRAVENOUS at 16:00

## 2018-10-08 RX ADMIN — MORPHINE SULFATE 2 MG: 2 INJECTION, SOLUTION INTRAMUSCULAR; INTRAVENOUS at 23:41

## 2018-10-08 RX ADMIN — IBUPROFEN 400 MG: 400 TABLET ORAL at 02:21

## 2018-10-08 RX ADMIN — Medication 5 ML: at 23:41

## 2018-10-08 RX ADMIN — HYDROCODONE BITARTRATE AND ACETAMINOPHEN 2 TABLET: 5; 325 TABLET ORAL at 20:12

## 2018-10-08 RX ADMIN — VANCOMYCIN HYDROCHLORIDE 750 MG: 10 INJECTION, POWDER, LYOPHILIZED, FOR SOLUTION INTRAVENOUS at 00:03

## 2018-10-08 RX ADMIN — MORPHINE SULFATE 2 MG: 2 INJECTION, SOLUTION INTRAMUSCULAR; INTRAVENOUS at 04:05

## 2018-10-08 RX ADMIN — VANCOMYCIN HYDROCHLORIDE 750 MG: 10 INJECTION, POWDER, LYOPHILIZED, FOR SOLUTION INTRAVENOUS at 23:37

## 2018-10-08 RX ADMIN — HYDROCODONE BITARTRATE AND ACETAMINOPHEN 2 TABLET: 5; 325 TABLET ORAL at 14:01

## 2018-10-08 RX ADMIN — Medication 5 ML: at 23:42

## 2018-10-08 RX ADMIN — PIPERACILLIN SODIUM,TAZOBACTAM SODIUM 3.38 G: 3; .375 INJECTION, POWDER, FOR SOLUTION INTRAVENOUS at 02:20

## 2018-10-08 RX ADMIN — Medication 10 ML: at 06:00

## 2018-10-08 RX ADMIN — HYDROCODONE BITARTRATE AND ACETAMINOPHEN 2 TABLET: 5; 325 TABLET ORAL at 06:39

## 2018-10-08 RX ADMIN — Medication 10 ML: at 14:00

## 2018-10-08 RX ADMIN — HYDROCODONE BITARTRATE AND ACETAMINOPHEN 2 TABLET: 5; 325 TABLET ORAL at 00:03

## 2018-10-08 RX ADMIN — MORPHINE SULFATE 2 MG: 2 INJECTION, SOLUTION INTRAMUSCULAR; INTRAVENOUS at 17:15

## 2018-10-08 RX ADMIN — IBUPROFEN 400 MG: 400 TABLET ORAL at 20:35

## 2018-10-08 RX ADMIN — MORPHINE SULFATE 2 MG: 2 INJECTION, SOLUTION INTRAMUSCULAR; INTRAVENOUS at 10:43

## 2018-10-08 NOTE — PROGRESS NOTES
Spoke briefly to Mr. Trice Demarco in room 201 about discharge planning. 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 He has a positive amphetamine drug screen on this admission, and also two positive screens in 2013. Mr. Trice Demarco is \"self pay\" (no health insurance), and does not have a PCP since \"I haven't been sick. \"  Mr. Trice Demarco says that a [employer] rep (financial assistance review) has been by to see him. ID consult is pending. No other discharge needs identified, but may need medication voucher at discharge. If, however, he needs long-term IV abx therapy, will likely have to complete the therapy here at the hospital due to the documented positive amphetamine toxicology results. Await ID recommendations. Care Management Interventions Plan discussed with Pt/Family/Caregiver:  Yes

## 2018-10-08 NOTE — PROGRESS NOTES
Progress Note Patient: Iris Patel MRN: [id number]  SSN: [id number] YOB: 1975  Age: 43 y.o. Sex: male Admit Date: 10/5/2018 LOS: 3 days Subjective:  
 
Patient has no itching. He did not notice that he had rashes on his body. The area under his chin is still feeling full. No fever. No shaking. No chills. His eating is better. He is talking better as well. Objective:  
 
Vitals:  
 10/07/18 1921 10/07/18 2305 10/08/18 0300 10/08/18 9004 BP: 140/82 125/62 110/84 132/79 Pulse: 73 72 80 73 Resp: 17 17 17 19 Temp: 100.1 °F (37.8 °C) 97.3 °F (36.3 °C) 97.8 °F (36.6 °C) 97.8 °F (36.6 °C) SpO2: 99% 100% 98% 98% Weight:      
Height:      
  
 
Intake and Output: 
Current Shift:   
Last three shifts: 10/06 1901 - 10/08 0700 In: 6497 [I.V.:1128] Out: [address] [place] Physical Exam:  
 
General:                    The patient is a pleasant male in no acute respiratory distress. Less swollen of the lower area of the face. The area where pustules have drained is crusted now. Head:                                   Less swollen at the right lower area of face and less warmth at chin and right lower mandibular area. Eyes:                                   No palpebral pallor or scleral icterus. ENT:                                    External auricular and nasal exam within normal limits. Mucous membranes are moist. 
Neck:                                   Supple, non-tender, no JVD. Lungs:                       Clear to auscultation bilaterally without wheezes or crackles. No respiratory distress or accessory muscle use. Heart:                                  Regular rate and rhythm, without murmurs, rubs, or gallops. Abdomen:                  Soft, non-tender, non-distended with normoactive bowel sounds. Genitourinary:           No tenderness over the bladder or bilateral CVAs. Extremities:               Without clubbing, cyanosis, or edema. Skin:                                    multiple erythematous maculopapular rashes on the lower torso, thighs, legs. Pulses:                      Radial and dorsalis pedis pulses present 2+ bilaterally. Capillary refill <2s. Neurologic:                CN II-XII grossly intact and symmetrical.  
                                            Moving all four extremities well with no focal deficits. Psychiatric:                Pleasant demeanor, appropriate affect. Alert and oriented x 3 Lab/Data Review: 
 
Recent Results (from the past 24 hour(s)) CBC WITH AUTOMATED DIFF Collection Time: 10/08/18  3:55 AM  
Result Value Ref Range WBC 13.1 (H) 4.3 - 11.1 K/uL  
 RBC 4.84 4.23 - 5.6 M/uL  
 HGB 15.3 13.6 - 17.2 g/dL HCT 45.5 41.1 - 50.3 % MCV 94.0 79.6 - 97.8 FL  
 MCH 31.6 26.1 - 32.9 PG  
 MCHC 33.6 31.4 - 35.0 g/dL  
 RDW 12.1 % PLATELET 830 492 - 730 K/uL MPV 10.7 9.4 - 12.3 FL ABSOLUTE NRBC 0.00 0.0 - 0.2 K/uL  
 DF AUTOMATED NEUTROPHILS 70 43 - 78 % LYMPHOCYTES 20 13 - 44 % MONOCYTES 8 4.0 - 12.0 % EOSINOPHILS 2 0.5 - 7.8 % BASOPHILS 1 0.0 - 2.0 % IMMATURE GRANULOCYTES 1 0.0 - 5.0 %  
 ABS. NEUTROPHILS 9.1 (H) 1.7 - 8.2 K/UL  
 ABS. LYMPHOCYTES 2.6 0.5 - 4.6 K/UL  
 ABS. MONOCYTES 1.0 0.1 - 1.3 K/UL  
 ABS. EOSINOPHILS 0.3 0.0 - 0.8 K/UL  
 ABS. BASOPHILS 0.1 0.0 - 0.2 K/UL  
 ABS. IMM. GRANS. 0.1 0.0 - 0.5 K/UL METABOLIC PANEL, BASIC Collection Time: 10/08/18  3:55 AM  
Result Value Ref Range Sodium 139 136 - 145 mmol/L Potassium 4.1 3.5 - 5.1 mmol/L Chloride 106 98 - 107 mmol/L  
 CO2 25 21 - 32 mmol/L Anion gap 8 7 - 16 mmol/L Glucose 94 65 - 100 mg/dL BUN 6 6 - 23 MG/DL Creatinine 0.76 (L) 0.8 - 1.5 MG/DL  
 GFR est AA >60 >60 ml/min/1.73m2 GFR est non-AA >60 >60 ml/min/1.73m2 Calcium 8.8 8.3 - 10.4 MG/DL All Micro Results Procedure Component Value Units Date/Time CULTURE, BLOOD [[id number]] Collected:  10/06/18 1017 Order Status:  Completed Specimen:  Blood from Blood Updated:  10/08/18 1012 Special Requests: --     
  RIGHT 
ARM Culture result: NO GROWTH 2 DAYS     
 CULTURE, BLOOD [[id number]]  (Abnormal) Collected:  10/05/18 1544 Order Status:  Completed Specimen:  Blood from Blood Updated:  10/08/18 5512 Special Requests: --     
  LEFT Antecubital 
  
  GRAM STAIN GRAM POSITIVE COCCI ANAEROBIC BOTTLE POSITIVE RESULTS VERIFIED, PHONED TO AND READ BACK BY Pushpa Shell RN AT 9264 ON 10/6/18 SG  
  Culture result: STAPHYLOCOCCUS AUREUS (A) For Susceptibility Refer to Culture [facility] [id number] CULTURE IN PROGRESS,FURTHER UPDATES TO FOLLOW CULTURE, BLOOD [[id number]]  (Abnormal) Collected:  10/05/18 1549 Order Status:  Completed Specimen:  Blood from Blood Updated:  10/08/18 0964 Special Requests: --     
  RIGHT Antecubital 
  
  GRAM STAIN GRAM POSITIVE RODS ANAEROBIC BOTTLE POSITIVE RESULTS VERIFIED, PHONED TO AND READ BACK BY Lindy WILD RN AT 2112 ON 10/6/18 SG  
        
  GRAM POS COCCI IN CLUSTERS  
   AEROBIC BOTTLE POSITIVE RESULTS VERIFIED, PHONED TO AND READ BACK BY EMILY HARDIN @ 0798 ON [id number] BY David Witt Culture result:      
  BACILLUS SPECIES ANAEROBIC BOTTLE POSITIVE THIS ORGANISM MAY BE INDICATIVE OF CULTURE CONTAMINATION, HOWEVER, CLINICAL CORRELATION NEEDS TO BE EVALUATED, AS EACH CASE IS UNIQUE. (A) STAPHYLOCOCCUS AUREUS SENSITIVITY TO FOLLOW (A) MRSA target DNA sequences detected:  SA target DNA sequence detected within the sample. Test performed by PCR   Culture/Sensitivity to follow (A)   RESULTS VERIFIED, PHONED TO AND READ BACK BY 
 JULIANA HENDRICKSON ON 10/07/18 @4698, ADS 
  
  
 
CT maxillofacial  
10/5/2018 IMPRESSION: Extensive soft tissue edema surrounding the jaw with 2.8 cm 
subcutaneous fluid collection at the right anterolateral aspect of the mandible. An additional smaller fluid collection sits slightly more superiorly, measuring 
0.9 cm. No evidence of associated osseous abnormality 
  
Prominent right cervical lymph nodes likely reactive. Current Facility-Administered Medications:  
  vancomycin (VANCOCIN) 750 mg in  ml infusion, 750 mg, IntraVENous, Q8H, Paula Greenfield MD, Last Rate: 125 mL/hr at 10/08/18 0003, 750 mg at 10/08/18 0003   piperacillin-tazobactam (ZOSYN) 3.375 g in 0.9% sodium chloride (MBP/ADV) 100 mL, 3.375 g, IntraVENous, Q8H, Elicia Ospina MD, Last Rate: 25 mL/hr at 10/08/18 0220, 3.375 g at 10/08/18 0220 
  HYDROcodone-acetaminophen (NORCO) 5-325 mg per tablet 2 Tab, 2 Tab, Oral, Q6H PRN, Joseph Colon MD, 2 Tab at 10/08/18 4312   ibuprofen (MOTRIN) tablet 400 mg, 400 mg, Oral, Q6H PRN, Elicia Ospina MD, 400 mg at 10/08/18 0221 
  sodium chloride (NS) flush 5-10 mL, 5-10 mL, IntraVENous, Q8H, Malia Trujillo MD, 10 mL at 10/08/18 0600 
  sodium chloride (NS) flush 5-10 mL, 5-10 mL, IntraVENous, PRN, Malia Trujillo MD, 10 mL at 10/06/18 1729   acetaminophen (TYLENOL) tablet 650 mg, 650 mg, Oral, Q6H PRN, Malia Trujillo MD, 650 mg at 10/06/18 0018   morphine injection 2 mg, 2 mg, IntraVENous, Q4H PRN, Malia Trujillo MD, 2 mg at 10/08/18 0405   naloxone (NARCAN) injection 0.4 mg, 0.4 mg, IntraVENous, PRN, Malia Trujillo MD 
  ondansetron (ZOFRAN) injection 4 mg, 4 mg, IntraVENous, Q4H PRN, Malia Trujillo MD 
  nicotine (NICODERM CQ) 21 mg/24 hr patch 1 Patch, 1 Patch, TransDERmal, DAILY, Malia Trujillo MD, 1 Patch at 10/08/18 0900 
  influenza vaccine 2018-19 (6 mos+)(PF) (FLUARIX QUAD/FLULAVAL QUAD) injection 0.5 mL, 0.5 mL, IntraMUSCular, PRIOR TO DISCHARGE, Jaycob Pulido MD 
 
  
 
Assessment:  
 
Principal Problem: 
  Cellulitis (10/5/2018) Active Problems: 
  Oral abscess (10/5/2018) Elevated bilirubin (10/5/2018) Tobacco use (10/5/2018) Plan:  
 
Facial cellulitis, with MRSA. Anaerobic culture positive is likely contamination? Continue with Vancomycin. Stop Zosyn Continue IV fluid. ENT consultant saw patient, no intervention is needed so far. Pain control. Monitor. Advised him about skin hygiene. New skin rashes. Stop Zosyn. Consult ID for plan of treatment and antibiotic choices. Possibility of drug allergy to ?clindamycin or Vancomycin. More likely reaction is from Zosyn since it was started yesterday. Tobacco use 
methamphetamine use I advised patient to quit. Elevated bilirubin Resolved I have discussed the plan of care with patient. DVT prophylaxis : SCD Signed By: Bijal Mcdaniel MD   
 October 8, 2018

## 2018-10-08 NOTE — CONSULTS
Infectious Disease Consult    Today's Date: 10/8/2018   Admit Date: 10/5/2018    Impression:   · MRSA, Bacillus species bacteremia (10/5); repeat BC (10/6) NGTD   · Facial cellulitis/2.8 cm subcutaneous fluid collection at the right anterolateral aspect of the mandible resolving  · Fever and leukocytosis resolving  · Tobacco abuse  · UDS (+) Opioids & Amphetamines  · Acute bilateral maculopapular inner thigh and lower flank rash; ?drug eruption; onset symptom today    Plan:   · Continue IV Vancomycin for now  · Not sure if drug rash is from Vancomycin or Zosyn  · Follow repeat blood culture  · Obtain TTE  · ID will continue to follow    Anti-infectives:   · IV Vancomycin (10/7-  · IV Clindamycin (10/5-10/7)  · IV Zosyn (10/7-10/8)    Subjective:   Date of Consultation:  October 8, 2018  Referring Physician: Christa Kwon MD    Patient is a 43 y.o. male with PMH of tobacco use evaluated for 2 days in a row for worsening right facial pain and edema. He was seen in the ED 10-4-18 for same complaints and prescribed Bactrim for facial cellulitis that he has failed to take to date. 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 Denies dental issues though he has poor dentition. Has had fever, oral pain and anorexia with nausea/ emesis. CT maxillofacial shows extensive soft tissue edema with 2.8 cm fluid collection right anterolateral mandible and 0.9 cm fluid collection more superiorly. ENT called by Dr. Michelle Young of ED. Seen by Dr. Yeison Hall 10/6/18. No operative intervention is planned at this time. Blood cultures obtained grew MRSA/Bacillus species. Repeat blood culture NGTD. Drug screen positive for Opiates & Amphetamines. Patient denies IVDA. Started on IV Vancomycin 10/7/18. ID is asked to evaluate patient for abx recommendations.     Patient Active Problem List   Diagnosis Code    Cellulitis L03.90    Oral abscess K12.2    Elevated bilirubin R17    Tobacco use Z72.0 History reviewed. No pertinent past medical history. No family history on file. Social History   Substance Use Topics    Smoking status: Former Smoker     Packs/day: 1.00    Smokeless tobacco: Current User    Alcohol use No     Past Surgical History:   Procedure Laterality Date    HX HEENT      tonsils      Prior to Admission medications    Medication Sig Start Date End Date Taking? Authorizing Provider   trimethoprim-sulfamethoxazole (BACTRIM DS) 160-800 mg per tablet Take 1 Tab by mouth two (2) times a day for 7 days. 10/4/18 10/11/18  Bishop Pebbles MD   prochlorperazine (COMPAZINE) 10 mg tablet Take 1 Tab by mouth every six (6) hours as needed for Other (as needed for nausea/vomiting/migraine). 17   Kassandra Quan MD   butalbital-acetaminophen-caff (FIORICET) -40 mg per capsule Take 1-2 Caps by mouth every six (6) hours as needed for Pain. Max Daily Amount: 8 Caps. 17   Kassandra Quan MD   gabapentin (NEURONTIN) 300 mg capsule Take one a day for 3 days,   Then increase to twice a day for 3 days  The increase to three times a day 17   Kassandra Quan MD       No Known Allergies     Review of Systems:  A comprehensive review of systems was negative except for that written in the History of Present Illness. Objective:     Visit Vitals    /90    Pulse 64    Temp 96.7 °F (35.9 °C)    Resp 18    Ht 5' 6\" (1.676 m)    Wt 72.6 kg (160 lb)    SpO2 95%    BMI 25.82 kg/m2     Temp (24hrs), Av.9 °F (36.6 °C), Min:96.7 °F (35.9 °C), Max:100.1 °F (37.8 °C)       Lines:  Peripheral IV:            Physical Exam:    General:  Alert, cooperative, well noursished, well developed, appears stated age   Eyes:  Sclera anicteric. Pupils equally round and reactive to light.    Mouth/Throat: Mucous membranes normal, oral pharynx clear, poor dentition   Neck: Supple   Lungs:   Clear to auscultation bilaterally, good effort   CV:  Regular rate and rhythm,no murmur, click, rub or gallop   Abdomen:   Soft, non-tender.  bowel sounds normal. non-distended   Extremities: No cyanosis or edema   Skin: Skin color, texture, turgor normal.  Acute facial cellulitis with multiple pustules, bilateral flank and inner thigh rash   Lymph nodes: Cervical and supraclavicular normal   Musculoskeletal: No swelling or deformity   Lines/Devices:  Intact, no erythema, drainage or tenderness   Psych: Alert and oriented, normal mood affect given the setting       Data Review:     CBC:  Recent Labs      10/08/18   0355  10/06/18   0431  10/05/18   1324   WBC  13.1*  17.9*  17.3*   GRANS  70  79*  77   MONOS  8  8  8   EOS  2  1  1   ANEU  9.1*  14.1*  13.3*   ABL  2.6  2.2  2.3   HGB  15.3  15.8  16.5   HCT  45.5  46.1  48.3   PLT  251  219  244       BMP:  Recent Labs      10/08/18   0355  10/06/18   0431  10/05/18   1324   CREA  0.76*  0.93  1.16   BUN  6  8  15   NA  139  138  136   K  4.1  3.5  4.1   CL  106  103  100   CO2  25  27  28   AGAP  8  8  8   GLU  94  133*  96       LFTS:  Recent Labs      10/06/18   0431  10/05/18   1324   TBILI  0.6  1.2*   ALT  41  54   SGOT  15  27   AP  68  72   TP  7.3  8.3*   ALB  3.5  4.3       Microbiology:     All Micro Results     Procedure Component Value Units Date/Time    CULTURE, BLOOD [[id number]] Collected:  10/06/18 1017    Order Status:  Completed Specimen:  Blood from Blood Updated:  10/08/18 1012     Special Requests: --        RIGHT  ARM       Culture result: NO GROWTH 2 DAYS       CULTURE, BLOOD [[id number]]  (Abnormal) Collected:  10/05/18 1544    Order Status:  Completed Specimen:  Blood from Blood Updated:  10/08/18 0706     Special Requests: --        LEFT  Antecubital       GRAM STAIN GRAM POSITIVE COCCI         ANAEROBIC BOTTLE POSITIVE                 RESULTS VERIFIED, PHONED TO AND READ BACK BY 42 Hailey Izaguirre RN AT 5999 ON 10/6/18 SG     Culture result: STAPHYLOCOCCUS AUREUS (A)               For Susceptibility Refer to Culture  [facility] [id number] CULTURE IN PROGRESS,FURTHER UPDATES TO FOLLOW    CULTURE, BLOOD [[id number]]  (Abnormal) Collected:  10/05/18 1545    Order Status:  Completed Specimen:  Blood from Blood Updated:  10/08/18 0702     Special Requests: --        RIGHT  Antecubital       GRAM STAIN GRAM POSITIVE RODS         ANAEROBIC BOTTLE POSITIVE                 RESULTS VERIFIED, PHONED TO AND READ BACK BY Christopher Syed RN AT [address] ON 10/6/18 SG              GRAM POS COCCI IN CLUSTERS      AEROBIC BOTTLE POSITIVE                 RESULTS VERIFIED, PHONED TO AND READ BACK BY EMILY HARDIN @ 0409 ON [id number] BY [facility]     Culture result:         BACILLUS SPECIES ANAEROBIC BOTTLE POSITIVE THIS ORGANISM MAY BE INDICATIVE OF CULTURE CONTAMINATION, HOWEVER, CLINICAL CORRELATION NEEDS TO BE EVALUATED, AS EACH CASE IS UNIQUE. (A)              STAPHYLOCOCCUS AUREUS SENSITIVITY TO FOLLOW (A)              MRSA target DNA sequences detected:  SA target DNA sequence detected within the sample. Test performed by PCR   Culture/Sensitivity to follow (A)            RESULTS VERIFIED, PHONED TO AND READ BACK BY  Lakhwinder Ko ON 10/07/18 @0637, ADS            Imaging:   10/5/CT  IMPRESSION: Extensive soft tissue edema surrounding the jaw with 2.8 cm  subcutaneous fluid collection at the right anterolateral aspect of the mandible. An additional smaller fluid collection sits slightly more superiorly, measuring  0.9 cm. No evidence of associated osseous abnormality     Prominent right cervical lymph nodes likely reactive.     Signed By: Lilian Moreno NP     October 8, 2018

## 2018-10-08 NOTE — PROGRESS NOTES
Pt improved modestly. Cultures show MRSA, and vacomycin added to treatment. Exam shows continued swelling and pain in the midline, chin and neck. Some crusting where pustules have drained. Pt with what appears to be appropriate and effective treatment for staph facial cellulitis. Hopefully, sensitivities will indicate appropriate outpatient oral therapy.

## 2018-10-08 NOTE — PROGRESS NOTES
ID made aware of rash to lower extremities and to bilateral sides of abdomen. NP wants vancomycin continued.

## 2018-10-08 NOTE — PROGRESS NOTES
END OF SHIFT NOTE: 
 
INTAKE/OUTPUT 
10/07 0701 - 10/08 0700 In: 1108 [I.V.:1108] Out: 4545 [Urine:2675] Voiding: YES Catheter: YES Drain:   
 
 
 
 
 
Flatus: Patient does have flatus present. Stool:  0 occurrences. Characteristics: 
  
 
Emesis: 0 occurrences. Characteristics: VITAL SIGNS Patient Vitals for the past 12 hrs: 
 Temp Pulse Resp BP SpO2  
10/08/18 0300 97.8 °F (36.6 °C) 80 17 110/84 98 % 10/07/18 2305 97.3 °F (36.3 °C) 72 17 125/62 100 % 10/07/18 1921 100.1 °F (37.8 °C) 73 17 140/82 99 % Pain Assessment Pain Intensity 1: 8 (10/08/18 0408) Pain Location 1: Other (comment), Face Pain Intervention(s) 1: Medication (see MAR) Patient Stated Pain Goal: 0 Ambulating Yes Shift report given to oncoming nurse at the bedside. Ely Interiano

## 2018-10-09 LAB
ALBUMIN SERPL-MCNC: 2.9 G/DL (ref 3.5–5)
ALBUMIN/GLOB SERPL: 0.7 {RATIO} (ref 1.2–3.5)
ALP SERPL-CCNC: 77 U/L (ref 50–136)
ALT SERPL-CCNC: 31 U/L (ref 12–65)
ANION GAP SERPL CALC-SCNC: 7 MMOL/L (ref 7–16)
AST SERPL-CCNC: 18 U/L (ref 15–37)
BACTERIA SPEC CULT: ABNORMAL
BASOPHILS # BLD: 0.1 K/UL (ref 0–0.2)
BASOPHILS NFR BLD: 1 % (ref 0–2)
BILIRUB SERPL-MCNC: 0.3 MG/DL (ref 0.2–1.1)
BUN SERPL-MCNC: 7 MG/DL (ref 6–23)
CALCIUM SERPL-MCNC: 8.6 MG/DL (ref 8.3–10.4)
CHLORIDE SERPL-SCNC: 106 MMOL/L (ref 98–107)
CO2 SERPL-SCNC: 26 MMOL/L (ref 21–32)
CREAT SERPL-MCNC: 0.73 MG/DL (ref 0.8–1.5)
DIFFERENTIAL METHOD BLD: NORMAL
EOSINOPHIL # BLD: 0.4 K/UL (ref 0–0.8)
EOSINOPHIL NFR BLD: 4 % (ref 0.5–7.8)
ERYTHROCYTE [DISTWIDTH] IN BLOOD BY AUTOMATED COUNT: 11.9 %
GLOBULIN SER CALC-MCNC: 4 G/DL (ref 2.3–3.5)
GLUCOSE SERPL-MCNC: 102 MG/DL (ref 65–100)
GRAM STN SPEC: ABNORMAL
HCT VFR BLD AUTO: 46.1 % (ref 41.1–50.3)
HGB BLD-MCNC: 15.7 G/DL (ref 13.6–17.2)
IMM GRANULOCYTES # BLD: 0 K/UL (ref 0–0.5)
IMM GRANULOCYTES NFR BLD AUTO: 0 % (ref 0–5)
LYMPHOCYTES # BLD: 2.6 K/UL (ref 0.5–4.6)
LYMPHOCYTES NFR BLD: 25 % (ref 13–44)
MCH RBC QN AUTO: 32 PG (ref 26.1–32.9)
MCHC RBC AUTO-ENTMCNC: 34.1 G/DL (ref 31.4–35)
MCV RBC AUTO: 93.9 FL (ref 79.6–97.8)
MONOCYTES # BLD: 0.9 K/UL (ref 0.1–1.3)
MONOCYTES NFR BLD: 8 % (ref 4–12)
NEUTS SEG # BLD: 6.5 K/UL (ref 1.7–8.2)
NEUTS SEG NFR BLD: 62 % (ref 43–78)
NRBC # BLD: 0 K/UL (ref 0–0.2)
PLATELET # BLD AUTO: 313 K/UL (ref 150–450)
PMV BLD AUTO: 10.1 FL (ref 9.4–12.3)
POTASSIUM SERPL-SCNC: 4 MMOL/L (ref 3.5–5.1)
PROT SERPL-MCNC: 6.9 G/DL (ref 6.3–8.2)
RBC # BLD AUTO: 4.91 M/UL (ref 4.23–5.6)
SERVICE CMNT-IMP: ABNORMAL
SODIUM SERPL-SCNC: 139 MMOL/L (ref 136–145)
VANCOMYCIN TROUGH SERPL-MCNC: 12.1 UG/ML (ref 5–20)
WBC # BLD AUTO: 10.4 K/UL (ref 4.3–11.1)

## 2018-10-09 PROCEDURE — 80074 ACUTE HEPATITIS PANEL: CPT

## 2018-10-09 PROCEDURE — 87389 HIV-1 AG W/HIV-1&-2 AB AG IA: CPT

## 2018-10-09 PROCEDURE — 74011250636 HC RX REV CODE- 250/636: Performed by: INTERNAL MEDICINE

## 2018-10-09 PROCEDURE — 74011250637 HC RX REV CODE- 250/637: Performed by: INTERNAL MEDICINE

## 2018-10-09 PROCEDURE — 65270000029 HC RM PRIVATE

## 2018-10-09 PROCEDURE — 36415 COLL VENOUS BLD VENIPUNCTURE: CPT

## 2018-10-09 PROCEDURE — 80202 ASSAY OF VANCOMYCIN: CPT

## 2018-10-09 PROCEDURE — 85025 COMPLETE CBC W/AUTO DIFF WBC: CPT

## 2018-10-09 PROCEDURE — 80053 COMPREHEN METABOLIC PANEL: CPT

## 2018-10-09 RX ADMIN — HYDROCODONE BITARTRATE AND ACETAMINOPHEN 2 TABLET: 5; 325 TABLET ORAL at 08:21

## 2018-10-09 RX ADMIN — HYDROCODONE BITARTRATE AND ACETAMINOPHEN 2 TABLET: 5; 325 TABLET ORAL at 21:01

## 2018-10-09 RX ADMIN — VANCOMYCIN HYDROCHLORIDE 750 MG: 10 INJECTION, POWDER, LYOPHILIZED, FOR SOLUTION INTRAVENOUS at 16:10

## 2018-10-09 RX ADMIN — IBUPROFEN 400 MG: 400 TABLET ORAL at 21:02

## 2018-10-09 RX ADMIN — HYDROCODONE BITARTRATE AND ACETAMINOPHEN 2 TABLET: 5; 325 TABLET ORAL at 02:19

## 2018-10-09 RX ADMIN — IBUPROFEN 400 MG: 400 TABLET ORAL at 13:00

## 2018-10-09 RX ADMIN — MORPHINE SULFATE 2 MG: 2 INJECTION, SOLUTION INTRAMUSCULAR; INTRAVENOUS at 12:03

## 2018-10-09 RX ADMIN — Medication 10 ML: at 14:00

## 2018-10-09 RX ADMIN — Medication 10 ML: at 05:20

## 2018-10-09 RX ADMIN — Medication 5 ML: at 04:53

## 2018-10-09 RX ADMIN — MORPHINE SULFATE 2 MG: 2 INJECTION, SOLUTION INTRAMUSCULAR; INTRAVENOUS at 04:52

## 2018-10-09 RX ADMIN — Medication 10 ML: at 21:05

## 2018-10-09 RX ADMIN — VANCOMYCIN HYDROCHLORIDE 1000 MG: 1 INJECTION, POWDER, LYOPHILIZED, FOR SOLUTION INTRAVENOUS at 23:40

## 2018-10-09 RX ADMIN — HYDROCODONE BITARTRATE AND ACETAMINOPHEN 2 TABLET: 5; 325 TABLET ORAL at 16:10

## 2018-10-09 RX ADMIN — Medication 5 ML: at 04:52

## 2018-10-09 RX ADMIN — MORPHINE SULFATE 2 MG: 2 INJECTION, SOLUTION INTRAMUSCULAR; INTRAVENOUS at 18:53

## 2018-10-09 RX ADMIN — VANCOMYCIN HYDROCHLORIDE 750 MG: 10 INJECTION, POWDER, LYOPHILIZED, FOR SOLUTION INTRAVENOUS at 08:22

## 2018-10-09 NOTE — PROGRESS NOTES
Infectious Disease Note Today's Date: 10/9/2018 Admit Date: 10/5/2018 Impression: · MRSA, Bacillus species bacteremia (10/5); repeat BC (10/6) NGTD. TTE NG 
· Facial cellulitis/2.8 cm subcutaneous fluid collection at the right anterolateral aspect of the mandible resolving · Tobacco abuse · UDS (+) Opioids & Amphetamines but denies IVDU · Acute bilateral maculopapular inner thigh and lower flank rash; ?drug eruption Plan:  
· Continue IV Vancomycin for now. Duration depending on clinical status Anti-infectives: · IV Vancomycin (10/7- 
· IV Clindamycin (10/5-10/7) · IV Zosyn (10/7-10/8) Subjective:  
States he is feeling better. Rash stable to torso and LEs. No Known Allergies Review of Systems:  A comprehensive review of systems was negative except for that written in the History of Present Illness. Objective:  
 
Visit Vitals  /73  Pulse 66  Temp 98.3 °F (36.8 °C)  Resp 20  
 Ht 5' 6\" (1.676 m)  Wt 72.6 kg (160 lb)  SpO2 98%  BMI 25.82 kg/m2 Temp (24hrs), Av.2 °F (36.8 °C), Min:96.7 °F (35.9 °C), Max:98.9 °F (37.2 °C) Lines:  Peripheral IV:   
   
 
 
Physical Exam:   
General:  Alert, cooperative, well noursished, well developed, appears stated age Eyes:  Sclera anicteric. Pupils equally round and reactive to light. Mouth/Throat: Mucous membranes normal, oral pharynx clear, poor dentition Neck: Supple Lungs:   Clear to auscultation bilaterally, good effort CV:  Regular rate and rhythm,no murmur, click, rub or gallop Abdomen:   Soft, non-tender. bowel sounds normal. non-distended Extremities: No cyanosis or edema Skin: Skin color, texture, turgor normal.  Acute facial cellulitis with multiple pustules, bilateral flank and inner thigh rash- pruritic Lymph nodes: Cervical and supraclavicular normal  
Musculoskeletal: No swelling or deformity Lines/Devices:  Intact, no erythema, drainage or tenderness Psych: Alert and oriented, normal mood affect given the setting Data Review: CBC: 
Recent Labs 10/09/18 
 0408  10/08/18 
 9821 WBC  10.4  13.1* GRANS  62  70 MONOS  8  8 EOS  4  2 ANEU  6.5  9.1* ABL  2.6  2.6 HGB  15.7  15.3 HCT  46.1  45.5 PLT  313  251 BMP: 
Recent Labs 10/09/18 
 0408  10/08/18 
 2399 CREA  0.73*  0.76* BUN  7  6 NA  139  139  
K  4.0  4.1 CL  106  106 CO2  26  25 AGAP  7  8 GLU  102*  94 LFTS: 
Recent Labs 10/09/18 
 0408 TBILI  0.3 ALT  31 SGOT  18  
AP  77  
TP  6.9 ALB  2.9* Microbiology:  
 
All Micro Results Procedure Component Value Units Date/Time CULTURE, BLOOD [[id number]] Collected:  10/06/18 1017 Order Status:  Completed Specimen:  Blood from Blood Updated:  10/09/18 0092 Special Requests: --     
  RIGHT 
ARM Culture result: NO GROWTH 3 DAYS     
 CULTURE, BLOOD [[id number]]  (Abnormal)  (Susceptibility) Collected:  10/05/18 1545 Order Status:  Completed Specimen:  Blood from Blood Updated:  10/09/18 0703 Special Requests: --     
  RIGHT Antecubital 
  
  GRAM STAIN GRAM POSITIVE RODS ANAEROBIC BOTTLE POSITIVE RESULTS VERIFIED, PHONED TO AND READ BACK BY Herman WILD RN AT 0633 ON 10/6/18 SG  
        
  GRAM POS COCCI IN CLUSTERS  
   AEROBIC BOTTLE POSITIVE RESULTS VERIFIED, PHONED TO AND READ BACK BY EMILY HARDIN @ 9386 ON [id number] BY Adenike Kovacs Culture result:      
  BACILLUS SPECIES ANAEROBIC BOTTLE POSITIVE THIS ORGANISM MAY BE INDICATIVE OF CULTURE CONTAMINATION, HOWEVER, CLINICAL CORRELATION NEEDS TO BE EVALUATED, AS EACH CASE IS UNIQUE. (A)  
        
  **METHICILLIN RESISTANT STAPHYLOCOCCUS AUREUS** (A) MRSA target DNA sequences detected:  SA target DNA sequence detected within the sample. Test performed by PCR   Culture/Sensitivity to follow (A)   RESULTS VERIFIED, PHONED TO AND READ BACK BY 
 Savana November ON 10/07/18 @7199, ADS RESULTS VERIFIED, PHONED TO AND READ BACK BY 
[facility] RN AT 8132 10.9.18  
  
 CULTURE, BLOOD [[id number]]  (Abnormal) Collected:  10/05/18 1544 Order Status:  Completed Specimen:  Blood from Blood Updated:  10/08/18 7436 Special Requests: --     
  LEFT Antecubital 
  
  GRAM STAIN GRAM POSITIVE COCCI ANAEROBIC BOTTLE POSITIVE RESULTS VERIFIED, PHONED TO AND READ BACK BY Tico Ferris RN AT 0661 ON 10/6/18 SG  
  Culture result: STAPHYLOCOCCUS AUREUS (A) For Susceptibility Refer to Culture [facility] [id number] CULTURE IN PROGRESS,FURTHER UPDATES TO FOLLOW Imaging:  
10/5/CT IMPRESSION: Extensive soft tissue edema surrounding the jaw with 2.8 cm 
subcutaneous fluid collection at the right anterolateral aspect of the mandible. An additional smaller fluid collection sits slightly more superiorly, measuring 
0.9 cm. No evidence of associated osseous abnormality 
  
Prominent right cervical lymph nodes likely reactive. Signed By: Abdi Molina NP October 9, 2018

## 2018-10-09 NOTE — ROUTINE PROCESS
Called & spoke with Dr. Julio Hence of ID. Notified of patient complaints of increased swelling & decreased ability to speak. No changes noted. No new orders at this time will continue to monitor.

## 2018-10-09 NOTE — ROUTINE PROCESS
END OF SHIFT NOTE: 
 
INTAKE/OUTPUT 
10/08 0701 - 10/09 0700 In: 1320 [P.O.:1320] Out: 1450 [[facility]:2260] Voiding: YES Catheter: NO 
Drain:   
 
 
 
 
 
Flatus: Patient does have flatus present. Stool:  0 occurrences. Characteristics: 
  
 
Emesis: 0 occurrences. Characteristics: VITAL SIGNS Patient Vitals for the past 12 hrs: 
 Temp Pulse Resp BP SpO2  
10/08/18 2332 98.6 °F (37 °C) 69 19 128/70 98 % 10/08/18 1935 98.9 °F (37.2 °C) 75 19 143/86 100 % Pain Assessment Pain Intensity 1: 8 (10/09/18 0452) Pain Location 1: Face Pain Intervention(s) 1: Medication (see MAR) Patient Stated Pain Goal: 0 Ambulating Yes Shift report given to oncoming nurse at the bedside.  
 
Carrillo Babin LPN

## 2018-10-09 NOTE — PROGRESS NOTES
Pt with staph facial cellulitis. Exam immproved with less pain and swelling. More crusting of skin under the lower lip. Improving facial cellulitis. OK for discharge from my standpoint. Would recommend abx chosen by culture. ..doxycycline? And mupicicin ointment topically. Happy to follow as outpatient if needed.

## 2018-10-09 NOTE — PROGRESS NOTES
Problem: Falls - Risk of 
Goal: *Absence of Falls Document Alta Weinstein Fall Risk and appropriate interventions in the flowsheet. Outcome: Progressing Towards Goal 
Fall Risk Interventions: 
  
 
  
 
Medication Interventions: Patient to call before getting OOB, Teach patient to arise slowly

## 2018-10-09 NOTE — PROGRESS NOTES
Pharmacokinetic Consult to Pharmacist 
 
Ransom Severe is a 43 y.o. male being treated for SSTI of the face with vancomycin. Height: 5' 6\" (167.6 cm)  Weight: 72.6 kg (160 lb) Lab Results Component Value Date/Time BUN 7 10/09/2018 04:08 AM  
 Creatinine 0.73 (L) 10/09/2018 04:08 AM  
 WBC 10.4 10/09/2018 04:08 AM  
 Lactic Acid (POC) 0.7 10/05/2018 01:29 PM  
  
Estimated Creatinine Clearance: 119 mL/min (based on Cr of 0.73). CULTURES: 
All Micro Results Procedure Component Value Units Date/Time CULTURE, BLOOD [[id number]]  (Abnormal)  (Susceptibility) Collected:  10/05/18 7935 Order Status:  Completed Specimen:  Blood from Blood Updated:  10/09/18 1267 Special Requests: --     
  RIGHT Antecubital 
  
  GRAM STAIN GRAM POSITIVE RODS ANAEROBIC BOTTLE POSITIVE RESULTS VERIFIED, PHONED TO AND READ BACK BY Herman WILD RN AT 8833 ON 10/6/18 SG  
        
  GRAM POS COCCI IN CLUSTERS  
   AEROBIC BOTTLE POSITIVE RESULTS VERIFIED, PHONED TO AND READ BACK BY WILFREDRN @ 0667 ON [id number] BY Adenike Kovacs Culture result:      
  BACILLUS SPECIES ANAEROBIC BOTTLE POSITIVE THIS ORGANISM MAY BE INDICATIVE OF CULTURE CONTAMINATION, HOWEVER, CLINICAL CORRELATION NEEDS TO BE EVALUATED, AS EACH CASE IS UNIQUE. (A)  
        
  **METHICILLIN RESISTANT STAPHYLOCOCCUS AUREUS** (A) MRSA target DNA sequences detected:  SA target DNA sequence detected within the sample. Test performed by PCR   Culture/Sensitivity to follow (A) RESULTS VERIFIED, PHONED TO AND READ BACK BY 
Arlet Jiménez ON 10/07/18 @0637, ADS RESULTS VERIFIED, PHONED TO AND READ BACK BY 
Nadiya Manley RN AT 9477 10.9.18 CW Use Rifampin only in conjunction with another antimicrobial agent. Do not use as Monotherapy. CULTURE, BLOOD [[id number]] Collected:  10/06/18 1017 Order Status:  Completed Specimen:  Blood from Blood Updated:  10/09/18 8555 Special Requests: --     
  RIGHT 
ARM Culture result: NO GROWTH 3 DAYS     
 CULTURE, BLOOD [[id number]]  (Abnormal) Collected:  10/05/18 1544 Order Status:  Completed Specimen:  Blood from Blood Updated:  10/08/18 2960 Special Requests: --     
  LEFT Antecubital 
  
  GRAM STAIN GRAM POSITIVE COCCI ANAEROBIC BOTTLE POSITIVE RESULTS VERIFIED, PHONED TO AND READ BACK BY Brian Wells RN AT 0665 ON 10/6/18 SG  
  Culture result: STAPHYLOCOCCUS AUREUS (A) For Susceptibility Refer to Culture [address] [id number] CULTURE IN PROGRESS,FURTHER UPDATES TO FOLLOW Lab Results Component Value Date/Time Vancomycin,trough 12.1 10/09/2018 03:01 PM  
 
 
 
Day 2 of vancomycin. Goal trough is 10-20. Vancomycin trough level resulted at 12.1. As this is towards the lower end of range, will increase to 1000 mg Q8H. Trough prior to the 4th dose. Will continue to follow patient. Thank you, 
Patrice Whitt, PharmD Clinical Pharmacist 
[phone]

## 2018-10-09 NOTE — PROGRESS NOTES
Progress Note Patient: Edna Lombardo MRN: [id number]  SSN: [id number] YOB: 1975  Age: 43 y.o. Sex: male Admit Date: 10/5/2018 LOS: 4 days Subjective:  
 
Patient reports that rashes on his body are fading more. No itching. No fever. No chills. No shortness of breath. Eating is OK. He is draining more pus from the opening of the right anterolateral area of collection beneath his chin. Objective:  
 
Vitals:  
 10/08/18 1532 10/08/18 1935 10/08/18 2332 10/09/18 5915 BP: 133/82 143/86 128/70 138/73 Pulse: 65 75 69 66 Resp: 19 19 19 20 Temp: 98.7 °F (37.1 °C) 98.9 °F (37.2 °C) 98.6 °F (37 °C) 98.3 °F (36.8 °C) SpO2: 98% 100% 98% 98% Weight:      
Height:      
  
 
Intake and Output: 
Current Shift:   
Last three shifts: 10/07 1901 - 10/09 0700 In: 2428 [P.O.:1320; I.V.:1108] Out: 9344 [[id number]:[id number]] Physical Exam:  
 
General:                    The patient is a pleasant male in no acute respiratory distress. Less swollen of the lower area of the face. The area where pustules have drained is draining pus. Head:                                   Less swollen at the right lower area of face and less warmth at chin and right lower mandibular area. Eyes:                                   No palpebral pallor or scleral icterus. ENT:                                    External auricular and nasal exam within normal limits. Mucous membranes are moist. 
Neck:                                   Supple, non-tender, no JVD. Lungs:                       Clear to auscultation bilaterally without wheezes or crackles. No respiratory distress or accessory muscle use. Heart:                                  Regular rate and rhythm, without murmurs, rubs, or gallops. Abdomen:                  Soft, non-tender, non-distended with normoactive bowel sounds. Genitourinary:           No tenderness over the bladder or bilateral CVAs. Extremities:               Without clubbing, cyanosis, or edema. Skin:                                    multiple erythematous maculopapular rashes on the lower torso, thighs, legs. Rashes are fading. Color becomes pink rather than red on 10/8/2018. Pulses:                      Radial and dorsalis pedis pulses present 2+ bilaterally. Capillary refill <2s. Neurologic:                CN II-XII grossly intact and symmetrical.  
                                            Moving all four extremities well with no focal deficits. Psychiatric:                Pleasant demeanor, appropriate affect. Alert and oriented x 3 Lab/Data Review: 
 
Recent Results (from the past 24 hour(s)) METABOLIC PANEL, COMPREHENSIVE Collection Time: 10/09/18  4:08 AM  
Result Value Ref Range Sodium 139 136 - 145 mmol/L Potassium 4.0 3.5 - 5.1 mmol/L Chloride 106 98 - 107 mmol/L  
 CO2 26 21 - 32 mmol/L Anion gap 7 7 - 16 mmol/L Glucose 102 (H) 65 - 100 mg/dL BUN 7 6 - 23 MG/DL Creatinine 0.73 (L) 0.8 - 1.5 MG/DL  
 GFR est AA >60 >60 ml/min/1.73m2 GFR est non-AA >60 >60 ml/min/1.73m2 Calcium 8.6 8.3 - 10.4 MG/DL Bilirubin, total 0.3 0.2 - 1.1 MG/DL  
 ALT (SGPT) 31 12 - 65 U/L  
 AST (SGOT) 18 15 - 37 U/L Alk. phosphatase 77 50 - 136 U/L Protein, total 6.9 6.3 - 8.2 g/dL Albumin 2.9 (L) 3.5 - 5.0 g/dL Globulin 4.0 (H) 2.3 - 3.5 g/dL A-G Ratio 0.7 (L) 1.2 - 3.5    
CBC WITH AUTOMATED DIFF Collection Time: 10/09/18  4:08 AM  
Result Value Ref Range WBC 10.4 4.3 - 11.1 K/uL  
 RBC 4.91 4.23 - 5.6 M/uL  
 HGB 15.7 13.6 - 17.2 g/dL HCT 46.1 41.1 - 50.3 % MCV 93.9 79.6 - 97.8 FL  
 MCH 32.0 26.1 - 32.9 PG  
 MCHC 34.1 31.4 - 35.0 g/dL  
 RDW 11.9 % PLATELET 452 901 - 445 K/uL MPV 10.1 9.4 - 12.3 FL ABSOLUTE NRBC 0.00 0.0 - 0.2 K/uL DF AUTOMATED NEUTROPHILS 62 43 - 78 % LYMPHOCYTES 25 13 - 44 % MONOCYTES 8 4.0 - 12.0 % EOSINOPHILS 4 0.5 - 7.8 % BASOPHILS 1 0.0 - 2.0 % IMMATURE GRANULOCYTES 0 0.0 - 5.0 %  
 ABS. NEUTROPHILS 6.5 1.7 - 8.2 K/UL  
 ABS. LYMPHOCYTES 2.6 0.5 - 4.6 K/UL  
 ABS. MONOCYTES 0.9 0.1 - 1.3 K/UL  
 ABS. EOSINOPHILS 0.4 0.0 - 0.8 K/UL  
 ABS. BASOPHILS 0.1 0.0 - 0.2 K/UL  
 ABS. IMM. GRANS. 0.0 0.0 - 0.5 K/UL All Micro Results Procedure Component Value Units Date/Time CULTURE, BLOOD [[id number]] Collected:  10/06/18 1017 Order Status:  Completed Specimen:  Blood from Blood Updated:  10/09/18 6190 Special Requests: --     
  RIGHT 
ARM Culture result: NO GROWTH 3 DAYS     
 CULTURE, BLOOD [[id number]]  (Abnormal)  (Susceptibility) Collected:  10/05/18 1545 Order Status:  Completed Specimen:  Blood from Blood Updated:  10/09/18 0703 Special Requests: --     
  RIGHT Antecubital 
  
  GRAM STAIN GRAM POSITIVE RODS ANAEROBIC BOTTLE POSITIVE RESULTS VERIFIED, PHONED TO AND READ BACK BY Herman WILD RN AT 0708 ON 10/6/18 SG  
        
  GRAM POS COCCI IN CLUSTERS  
   AEROBIC BOTTLE POSITIVE RESULTS VERIFIED, PHONED TO AND READ BACK BY EMILY HARDIN @ 3233 ON [id number] BY Adenike Kovacs Culture result:      
  BACILLUS SPECIES ANAEROBIC BOTTLE POSITIVE THIS ORGANISM MAY BE INDICATIVE OF CULTURE CONTAMINATION, HOWEVER, CLINICAL CORRELATION NEEDS TO BE EVALUATED, AS EACH CASE IS UNIQUE. (A)  
        
  **METHICILLIN RESISTANT STAPHYLOCOCCUS AUREUS** (A) MRSA target DNA sequences detected:  SA target DNA sequence detected within the sample. Test performed by PCR   Culture/Sensitivity to follow (A) RESULTS VERIFIED, PHONED TO AND READ BACK BY 
Arlet Jiménez ON 10/07/18 @4837, ADS   RESULTS VERIFIED, PHONED TO AND READ BACK BY 
Nadiya Manley RN AT 7861 10.9.18 CW 
  
 CULTURE, BLOOD [[id number]]  (Abnormal) Collected:  10/05/18 1544 Order Status:  Completed Specimen:  Blood from Blood Updated:  10/08/18 9100 Special Requests: --     
  LEFT Antecubital 
  
  GRAM STAIN GRAM POSITIVE COCCI ANAEROBIC BOTTLE POSITIVE RESULTS VERIFIED, PHONED TO AND READ BACK BY Tico Ferris RN AT 7248 ON 10/6/18 SG  
  Culture result: STAPHYLOCOCCUS AUREUS (A) For Susceptibility Refer to Culture [address] [id number] CULTURE IN PROGRESS,FURTHER UPDATES TO FOLLOW  
  
 
CT maxillofacial  
10/5/2018 IMPRESSION: Extensive soft tissue edema surrounding the jaw with 2.8 cm 
subcutaneous fluid collection at the right anterolateral aspect of the mandible. An additional smaller fluid collection sits slightly more superiorly, measuring 
0.9 cm. No evidence of associated osseous abnormality 
  
Prominent right cervical lymph nodes likely reactive. Current Facility-Administered Medications:  
  vancomycin (VANCOCIN) 750 mg in  ml infusion, 750 mg, IntraVENous, Q8H, Paula Greenfield MD, Last Rate: 125 mL/hr at 10/09/18 0822, 750 mg at 10/09/18 0822 
  HYDROcodone-acetaminophen (NORCO) 5-325 mg per tablet 2 Tab, 2 Tab, Oral, Q6H PRN, Keith Henson MD, 2 Tab at 10/09/18 2343   ibuprofen (MOTRIN) tablet 400 mg, 400 mg, Oral, Q6H PRN, Choncharoen MD Bhavesh, 400 mg at 10/08/18 2035   sodium chloride (NS) flush 5-10 mL, 5-10 mL, IntraVENous, Q8H, Johana Espino MD, 10 mL at 10/09/18 0520 
  sodium chloride (NS) flush 5-10 mL, 5-10 mL, IntraVENous, PRN, Johana Espino MD, 5 mL at 10/09/18 0453   acetaminophen (TYLENOL) tablet 650 mg, 650 mg, Oral, Q6H PRN, Johana Espino MD, 650 mg at 10/06/18 0018   morphine injection 2 mg, 2 mg, IntraVENous, Q4H PRN, Johana Espino MD, 2 mg at 10/09/18 8651   naloxone (NARCAN) injection 0.4 mg, 0.4 mg, IntraVENous, PRN, Johana Espino MD 
   ondansetron (ZOFRAN) injection 4 mg, 4 mg, IntraVENous, Q4H PRN, Crystal Grijalva MD 
  nicotine (NICODERM CQ) 21 mg/24 hr patch 1 Patch, 1 Patch, TransDERmal, DAILY, Crystal Grijalva MD, 1 Patch at 10/09/18 0743 
  influenza vaccine 2018-19 (6 mos+)(PF) (FLUARIX QUAD/FLULAVAL QUAD) injection 0.5 mL, 0.5 mL, IntraMUSCular, PRIOR TO DISCHARGE, You Ordoñez MD 
 
  
 
Assessment:  
 
Principal Problem: 
  Cellulitis (10/5/2018) Active Problems: 
  Oral abscess (10/5/2018) Elevated bilirubin (10/5/2018) Tobacco use (10/5/2018) Plan:  
 
Facial cellulitis, with MRSA. Anaerobic culture positive result is likely contamination. Continue with Vancomycin. Stop Zosyn that was started only for one day before the rashes occurred. Continue IV fluid. ENT consultant saw patient, no intervention is needed so far. Pain control. Monitor. Advised him about skin hygiene. New skin rashes. Stopped Zosyn. Will identify him as having penicillin allergy. Consulted ID for plan of treatment and antibiotic choices. Appreciate help. Continue IV Vancomycin as of now. Tobacco use 
methamphetamine use I advised patient to quit. Elevated bilirubin Resolved I have discussed the plan of care with patient and . DVT prophylaxis : SCD Signed By: Jonathan Dobson MD   
 October 9, 2018

## 2018-10-10 PROBLEM — R78.81 MRSA BACTEREMIA: Status: ACTIVE | Noted: 2018-10-10

## 2018-10-10 PROBLEM — B95.62 MRSA BACTEREMIA: Status: ACTIVE | Noted: 2018-10-10

## 2018-10-10 LAB
HAV IGM SERPL QL IA: NEGATIVE
HBV CORE IGM SERPL QL IA: NEGATIVE
HBV SURFACE AG SERPL QL IA: NEGATIVE
HCV AB S/CO SERPL IA: >11 S/CO RATIO (ref 0–0.9)
HIV 1+2 AB+HIV1 P24 AG SERPL QL IA: NON REACTIVE
RPR SER QL: NONREACTIVE

## 2018-10-10 PROCEDURE — 74011250637 HC RX REV CODE- 250/637: Performed by: INTERNAL MEDICINE

## 2018-10-10 PROCEDURE — 65270000029 HC RM PRIVATE

## 2018-10-10 PROCEDURE — 86592 SYPHILIS TEST NON-TREP QUAL: CPT

## 2018-10-10 PROCEDURE — 36415 COLL VENOUS BLD VENIPUNCTURE: CPT

## 2018-10-10 PROCEDURE — 87522 HEPATITIS C REVRS TRNSCRPJ: CPT

## 2018-10-10 PROCEDURE — 74011250636 HC RX REV CODE- 250/636: Performed by: INTERNAL MEDICINE

## 2018-10-10 RX ADMIN — MORPHINE SULFATE 2 MG: 2 INJECTION, SOLUTION INTRAMUSCULAR; INTRAVENOUS at 07:40

## 2018-10-10 RX ADMIN — MORPHINE SULFATE 2 MG: 2 INJECTION, SOLUTION INTRAMUSCULAR; INTRAVENOUS at 14:12

## 2018-10-10 RX ADMIN — VANCOMYCIN HYDROCHLORIDE 1000 MG: 1 INJECTION, POWDER, LYOPHILIZED, FOR SOLUTION INTRAVENOUS at 15:35

## 2018-10-10 RX ADMIN — Medication 10 ML: at 05:44

## 2018-10-10 RX ADMIN — HYDROCODONE BITARTRATE AND ACETAMINOPHEN 2 TABLET: 5; 325 TABLET ORAL at 10:48

## 2018-10-10 RX ADMIN — IBUPROFEN 400 MG: 400 TABLET ORAL at 15:42

## 2018-10-10 RX ADMIN — MORPHINE SULFATE 2 MG: 2 INJECTION, SOLUTION INTRAMUSCULAR; INTRAVENOUS at 20:29

## 2018-10-10 RX ADMIN — Medication 10 ML: at 13:29

## 2018-10-10 RX ADMIN — HYDROCODONE BITARTRATE AND ACETAMINOPHEN 2 TABLET: 5; 325 TABLET ORAL at 17:35

## 2018-10-10 RX ADMIN — VANCOMYCIN HYDROCHLORIDE 1000 MG: 1 INJECTION, POWDER, LYOPHILIZED, FOR SOLUTION INTRAVENOUS at 07:11

## 2018-10-10 RX ADMIN — HYDROCODONE BITARTRATE AND ACETAMINOPHEN 2 TABLET: 5; 325 TABLET ORAL at 03:54

## 2018-10-10 NOTE — ROUTINE PROCESS
END OF SHIFT NOTE: 
 
INTAKE/OUTPUT 
10/09 0701 - 10/10 0700 In: 2776 [P.O.:2040; I.V.:250] Out: 1950 [OVKPU:6439] Voiding: YES Catheter: NO 
Drain:   
 
 
 
 
 
Flatus: Patient does have flatus present. Stool:  0 occurrences. Characteristics: 
  
 
Emesis: 0 occurrences. Characteristics: VITAL SIGNS Patient Vitals for the past 12 hrs: 
 Temp Pulse Resp BP SpO2  
10/10/18 0320 98.6 °F (37 °C) 64 18 130/65 96 % 10/09/18 2330 98.8 °F (37.1 °C) 60 18 124/66 98 % 10/09/18 1925 98.7 °F (37.1 °C) 61 19 128/68 99 % Pain Assessment Pain Intensity 1: 0 (10/10/18 0454) Pain Location 1: Face Pain Intervention(s) 1: Medication (see MAR) Patient Stated Pain Goal: 0 Ambulating Yes Shift report given to oncoming nurse at the bedside.  
 
Jackie Dickens LPN

## 2018-10-10 NOTE — PROGRESS NOTES
Problem: Falls - Risk of 
Goal: *Absence of Falls Document Aaliyah Perdomo Fall Risk and appropriate interventions in the flowsheet. Outcome: Progressing Towards Goal 
Fall Risk Interventions: 
  
 
  
 
Medication Interventions: Teach patient to arise slowly

## 2018-10-10 NOTE — PROGRESS NOTES
Infectious Disease Note Today's Date: 10/10/2018 Admit Date: 10/5/2018 Impression: · MRSA, Bacillus species bacteremia (10/5); repeat BC (10/6) NGTD. TTE NG 
· Facial cellulitis/2.8 cm subcutaneous fluid collection at the right anterolateral aspect of the mandible resolving · Tobacco abuse · UDS (+) Opioids & Amphetamines but denies IVDU · Acute bilateral maculopapular inner thigh and lower flank rash; ?drug eruption Plan:  
· Continue IV Vancomycin for now. Duration depending on clinical status · Drug eruption stable. Anti-infectives: · IV Vancomycin (10/7- 
· IV Clindamycin (10/5-10/7) · IV Zosyn (10/7-10/8) Subjective:  
No change in rash. Serosanguinous fluid from chin. Allergies Allergen Reactions  Penicillins Rash Review of Systems:  A comprehensive review of systems was negative except for that written in the History of Present Illness. Objective:  
 
Visit Vitals  /59  Pulse 66  Temp 97.9 °F (36.6 °C)  Resp 18  Ht 5' 6\" (1.676 m)  Wt 72.6 kg (160 lb)  SpO2 99%  BMI 25.82 kg/m2 Temp (24hrs), Av.4 °F (36.9 °C), Min:97.9 °F (36.6 °C), Max:98.8 °F (37.1 °C) Lines:  Peripheral IV:   
   
 
 
Physical Exam:   
General:  Alert, cooperative, well noursished, well developed, appears stated age Eyes:  Sclera anicteric. Pupils equally round and reactive to light. Mouth/Throat: Mucous membranes normal, oral pharynx clear, poor dentition Neck: Supple Lungs:   Clear to auscultation bilaterally, good effort CV:  Regular rate and rhythm,no murmur, click, rub or gallop Abdomen:   Soft, non-tender. bowel sounds normal. non-distended Extremities: No cyanosis or edema Skin: Skin color, texture, turgor normal.  Acute facial cellulitis with nodule, bilateral flank and inner thigh rash- pruritic Lymph nodes: Cervical and supraclavicular normal  
Musculoskeletal: No swelling or deformity Lines/Devices:  Intact, no erythema, drainage or tenderness Psych: Alert and oriented, normal mood affect given the setting Data Review: CBC: 
Recent Labs 10/09/18 
 0408  10/08/18 
 9443 WBC  10.4  13.1* GRANS  62  70 MONOS  8  8 EOS  4  2 ANEU  6.5  9.1* ABL  2.6  2.6 HGB  15.7  15.3 HCT  46.1  45.5 PLT  313  251 BMP: 
Recent Labs 10/09/18 
 0408  10/08/18 
 2412 CREA  0.73*  0.76* BUN  7  6 NA  139  139  
K  4.0  4.1 CL  106  106 CO2  26  25 AGAP  7  8 GLU  102*  94 LFTS: 
Recent Labs 10/09/18 
 0408 TBILI  0.3 ALT  31 SGOT  18  
AP  77  
TP  6.9 ALB  2.9* Microbiology:  
 
All Micro Results Procedure Component Value Units Date/Time CULTURE, BLOOD [[id number]] Collected:  10/06/18 1017 Order Status:  Completed Specimen:  Blood from Blood Updated:  10/10/18 1940 Special Requests: --     
  RIGHT 
ARM Culture result: NO GROWTH 4 DAYS     
 CULTURE, BLOOD [[id number]]  (Abnormal) Collected:  10/05/18 1544 Order Status:  Completed Specimen:  Blood from Blood Updated:  10/10/18 1239 Special Requests: --     
  LEFT Antecubital 
  
  GRAM STAIN      
  GRAM POS COCCI IN CLUSTERS  
   ANAEROBIC BOTTLE POSITIVE RESULTS VERIFIED, PHONED TO AND READ BACK BY Brian Wells RN AT 0889 ON 10/6/18 SG  
   AEROBIC BOTTLE POSITIVE RESULTS VERIFIED, PHONED TO AND READ BACK BY DELILAH Chadwick AT 9707 ON 10/10/2018 BY CAM  
  Culture result: STAPHYLOCOCCUS AUREUS (A) For Susceptibility Refer to Culture [address] [facility] [id number] CULTURE IN PROGRESS,FURTHER UPDATES TO FOLLOW CULTURE, BLOOD [[id number]]  (Abnormal)  (Susceptibility) Collected:  10/05/18 1545 Order Status:  Completed Specimen:  Blood from Blood Updated:  10/09/18 1005 Special Requests: --     
  RIGHT Antecubital 
  
  GRAM STAIN GRAM POSITIVE RODS    ANAEROBIC BOTTLE POSITIVE     
        
 RESULTS VERIFIED, PHONED TO AND READ BACK BY Errol WILD RN AT 7009 ON 10/6/18 SG  
        
  GRAM POS COCCI IN CLUSTERS  
   AEROBIC BOTTLE POSITIVE RESULTS VERIFIED, PHONED TO AND READ BACK BY WILFREDRN @ 9085 ON [id number] BY Yaz Dixon Culture result:      
  BACILLUS SPECIES ANAEROBIC BOTTLE POSITIVE THIS ORGANISM MAY BE INDICATIVE OF CULTURE CONTAMINATION, HOWEVER, CLINICAL CORRELATION NEEDS TO BE EVALUATED, AS EACH CASE IS UNIQUE. (A)  
        
  **METHICILLIN RESISTANT STAPHYLOCOCCUS AUREUS** (A) MRSA target DNA sequences detected:  SA target DNA sequence detected within the sample. Test performed by PCR   Culture/Sensitivity to follow (A) RESULTS VERIFIED, PHONED TO AND READ BACK BY 
Honey Alarcon ON 10/07/18 @0637, ADS RESULTS VERIFIED, PHONED TO AND READ BACK BY 
Mely Odonnell RN AT 0706 10.9.18 CW Use Rifampin only in conjunction with another antimicrobial agent. Do not use as Monotherapy. Imaging:  
10/5/CT IMPRESSION: Extensive soft tissue edema surrounding the jaw with 2.8 cm 
subcutaneous fluid collection at the right anterolateral aspect of the mandible. An additional smaller fluid collection sits slightly more superiorly, measuring 
0.9 cm. No evidence of associated osseous abnormality 
  
Prominent right cervical lymph nodes likely reactive. Signed By: Marilyn Thornton NP October 10, 2018

## 2018-10-10 NOTE — PROGRESS NOTES
Problem: Falls - Risk of 
Goal: *Absence of Falls Document Edie [facility] Fall Risk and appropriate interventions in the flowsheet. Outcome: Progressing Towards Goal 
Fall Risk Interventions: 
  
 
  
 
Medication Interventions: Patient to call before getting OOB, Teach patient to arise slowly

## 2018-10-10 NOTE — ROUTINE PROCESS
Dr Aylin García notified of positive blood cultures as reported as critical from lab. No new orders at this time. Will continue to monitor.

## 2018-10-10 NOTE — PROGRESS NOTES
Hospitalist Consult Note Admit Date:  10/5/2018  1:56 PM  
Name:  Juliann Delgado Age:  43 y.o. 
:  1975 MRN:  [id number] PCP:  None Treatment Team: Attending Provider: Nolvia Soni MD; Consulting Provider: Maris Holcomb MD; Utilization Review: Liana Espinosa; Consulting Provider: Ariella Corona MD; Care Manager: Jo Santamaria RN; Hospitalist: Nidhi Campbell MD 
 
HPI:  
Pain and swelling continue to improve. Still some mild drainage to inferior portion of lesion overlying chin. Afebrile. Able to eat and move mouth/jaw more as well. ROS otherwise negative. 10 systems reviewed and negative except as noted in HPI. History reviewed. No pertinent past medical history. Past Surgical History:  
Procedure Laterality Date  HX HEENT    
 tonsils Allergies Allergen Reactions  Penicillins Rash Social History Substance Use Topics  Smoking status: Former Smoker Packs/day: 1.00  Smokeless tobacco: Current User  Alcohol use No  
  
No family history on file. Immunization History Administered Date(s) Administered  TDAP Vaccine 2012 PTA Medications: 
Prior to Admission Medications Prescriptions Last Dose Informant Patient Reported? Taking? butalbital-acetaminophen-caff (FIORICET) -40 mg per capsule Not Taking at Unknown time  No No  
Sig: Take 1-2 Caps by mouth every six (6) hours as needed for Pain. Max Daily Amount: 8 Caps.  
gabapentin (NEURONTIN) 300 mg capsule Not Taking at Unknown time  No No  
Sig: Take one a day for 3 days, Then increase to twice a day for 3 days The increase to three times a day  
prochlorperazine (COMPAZINE) 10 mg tablet Not Taking at Unknown time  No No  
Sig: Take 1 Tab by mouth every six (6) hours as needed for Other (as needed for nausea/vomiting/migraine).   
trimethoprim-sulfamethoxazole (BACTRIM DS) 160-800 mg per tablet Not Taking at Unknown time  No No  
 Sig: Take 1 Tab by mouth two (2) times a day for 7 days. Facility-Administered Medications: None Objective:  
 
Patient Vitals for the past 24 hrs: 
 Temp Pulse Resp BP SpO2  
10/10/18 1100 97.9 °F (36.6 °C) 66 18 106/59 99 % 10/10/18 0720 97.9 °F (36.6 °C) 60 18 120/70 99 % 10/10/18 0320 98.6 °F (37 °C) 64 18 130/65 96 % 10/09/18 2330 98.8 °F (37.1 °C) 60 18 124/66 98 % 10/09/18 1925 98.7 °F (37.1 °C) 61 19 128/68 99 % 10/09/18 1504 98.4 °F (36.9 °C) 70 19 141/81 98 % Oxygen Therapy O2 Sat (%): 99 % (10/10/18 1100) O2 Device: Room air (10/10/18 0740) Intake/Output Summary (Last 24 hours) at 10/10/18 1110 Last data filed at 10/10/18 3252 Gross per 24 hour Intake           1813.9 ml Output             2050 ml Net           -236.1 ml Physical Exam: 
General:    Well nourished. Alert. Eyes:   Normal sclera. Extraocular movements intact. ENT:  Normocephalic, atraumatic. Moist mucous membranes CV:   RRR. No murmur, rub, or gallop. Lungs:  CTAB. No wheezing, rhonchi, or rales. Abdomen: Soft, nontender, nondistended. Bowel sounds normal.  
Extremities: Warm and dry. No cyanosis or edema. Neurologic: CN II-XII grossly intact. Sensation intact. Skin:     Small erythematous lesion underneath lip near rt side of chin w serous drainage; crusting over lower lesion over lower rt chin and purulent drainage from inferior aspect of this lesion Psych:  Normal mood and affect. I reviewed the labs, imaging, EKGs, telemetry, and other studies done this admission. Data Review:  
Recent Results (from the past 24 hour(s)) Ricky Romero Collection Time: 10/09/18  3:01 PM  
Result Value Ref Range Vancomycin,trough 12.1 5 - 20 ug/mL RPR Collection Time: 10/10/18  3:51 AM  
Result Value Ref Range RPR NONREACTIVE NR    
HCV RT-PCR, QUANT (NON-GRAPH) Collection Time: 10/10/18  8:51 AM  
Result Value Ref Range Hepatitis C Quantitation PENDING IU/mL HCV log10 PENDING log10 IU/mL Serial monitoring PENDING All Micro Results Procedure Component Value Units Date/Time CULTURE, BLOOD [[id number]] Collected:  10/06/18 1017 Order Status:  Completed Specimen:  Blood from Blood Updated:  10/10/18 3580 Special Requests: --     
  RIGHT 
ARM Culture result: NO GROWTH 4 DAYS     
 CULTURE, BLOOD [[id number]]  (Abnormal) Collected:  10/05/18 1544 Order Status:  Completed Specimen:  Blood from Blood Updated:  10/10/18 6222 Special Requests: --     
  LEFT Antecubital 
  
  GRAM STAIN      
  GRAM POS COCCI IN CLUSTERS  
   ANAEROBIC BOTTLE POSITIVE RESULTS VERIFIED, PHONED TO AND READ BACK BY Melchor Dodd RN AT 8800 ON 10/6/18 SG  
   AEROBIC BOTTLE POSITIVE RESULTS VERIFIED, PHONED TO AND READ BACK BY DELILAH Lunsford AT 9478 ON 10/10/2018 BY CAM  
  Culture result: STAPHYLOCOCCUS AUREUS (A) For Susceptibility Refer to Culture [address] [id number] CULTURE IN PROGRESS,FURTHER UPDATES TO FOLLOW CULTURE, BLOOD [[id number]]  (Abnormal)  (Susceptibility) Collected:  10/05/18 1545 Order Status:  Completed Specimen:  Blood from Blood Updated:  10/09/18 1005 Special Requests: --     
  RIGHT Antecubital 
  
  GRAM STAIN GRAM POSITIVE RODS ANAEROBIC BOTTLE POSITIVE RESULTS VERIFIED, PHONED TO AND READ BACK BY Jami Pallas PERKINS RN AT 0709 ON 10/6/18 SG  
        
  GRAM POS COCCI IN CLUSTERS  
   AEROBIC BOTTLE POSITIVE RESULTS VERIFIED, PHONED TO AND READ BACK BY EMILY HARDIN @ 5085 ON [id number] BY Jorje Armendariz Culture result:      
  BACILLUS SPECIES ANAEROBIC BOTTLE POSITIVE THIS ORGANISM MAY BE INDICATIVE OF CULTURE CONTAMINATION, HOWEVER, CLINICAL CORRELATION NEEDS TO BE EVALUATED, AS EACH CASE IS UNIQUE. (A)  
        
  **METHICILLIN RESISTANT STAPHYLOCOCCUS AUREUS** (A)   MRSA target DNA sequences detected:  SA target DNA sequence detected within the sample. Test performed by PCR   Culture/Sensitivity to follow (A) RESULTS VERIFIED, PHONED TO AND READ BACK BY 
Kamlesh Bello ON 10/07/18 @0637, ADS RESULTS VERIFIED, PHONED TO AND READ BACK BY 
Danilo Cali RN AT 1333 10.9.18 CW Use Rifampin only in conjunction with another antimicrobial agent. Do not use as Monotherapy. Current Facility-Administered Medications Medication Dose Route Frequency  vancomycin (VANCOCIN) 1,000 mg in 0.9% sodium chloride (MBP/ADV) 250 mL  1,000 mg IntraVENous Q8H  
 HYDROcodone-acetaminophen (NORCO) 5-325 mg per tablet 2 Tab  2 Tab Oral Q6H PRN  
 ibuprofen (MOTRIN) tablet 400 mg  400 mg Oral Q6H PRN  
 sodium chloride (NS) flush 5-10 mL  5-10 mL IntraVENous Q8H  
 sodium chloride (NS) flush 5-10 mL  5-10 mL IntraVENous PRN  
 acetaminophen (TYLENOL) tablet 650 mg  650 mg Oral Q6H PRN  
 morphine injection 2 mg  2 mg IntraVENous Q4H PRN  
 naloxone (NARCAN) injection 0.4 mg  0.4 mg IntraVENous PRN  
 ondansetron (ZOFRAN) injection 4 mg  4 mg IntraVENous Q4H PRN  
 nicotine (NICODERM CQ) 21 mg/24 hr patch 1 Patch  1 Patch TransDERmal DAILY  influenza vaccine 2018-19 (6 mos+)(PF) (FLUARIX QUAD/FLULAVAL QUAD) injection 0.5 mL  0.5 mL IntraMUSCular PRIOR TO DISCHARGE Other Studies: 
Ct Maxillofacial W Cont Result Date: 10/5/2018 EXAM:  CT MAXILLOFACIAL W CONT INDICATION:   facial cellulitis vs. abscess. COMPARISON: 7/30/2017. CONTRAST:   100 mL of Isovue-300. TECHNIQUE:CT of the face was performed following uneventful rapid bolus intravenous administration of contrast.  Brain and bone windows were generated. CT dose reduction was achieved through use of a standardized protocol tailored for this examination and automatic exposure control for dose modulation.  FINDINGS: There is extensive subcutaneous edema about the jaw bilaterally, right greater than left, with a small peripherally enhancing fluid collection at the right anterolateral aspect of the mandible, just beneath the skin which measures 1.2 x 1.4 x 2.8 cm. An additional small fluid collection is seen slightly more superior to this, anterior to the mandible measuring 0.9 x 0.4 cm. There is no evidence of osseous involvement. There were a few prominent right level 1 and level 2A lymph nodes which are likely reactive. The airway is symmetric and patent. Visualized portions of the thyroid gland are unremarkable. Normal course and caliber of the visualized vascular structures. Visualized intracranial contents are unremarkable. The paranasal sinuses and mastoid air cells are well aerated and clear. IMPRESSION: Extensive soft tissue edema surrounding the jaw with 2.8 cm subcutaneous fluid collection at the right anterolateral aspect of the mandible. An additional smaller fluid collection sits slightly more superiorly, measuring 0.9 cm. No evidence of associated osseous abnormality Prominent right cervical lymph nodes likely reactive. Assessment and Plan:  
 
Hospital Problems as of 10/10/2018  Never Reviewed Codes Class Noted - Resolved POA MRSA bacteremia ICD-10-CM: R78.81 ICD-9-CM: 790.7, 041.12  10/10/2018 - Present Unknown * (Principal)Cellulitis ICD-10-CM: L03.90 ICD-9-CM: 682.9  10/5/2018 - Present Yes Oral abscess ICD-10-CM: K12.2 ICD-9-CM: 528.3  10/5/2018 - Present Yes Elevated bilirubin ICD-10-CM: R17 
ICD-9-CM: 277.4  10/5/2018 - Present Yes Tobacco use (Chronic) ICD-10-CM: Z72.0 ICD-9-CM: 305.1  10/5/2018 - Present Yes PLAN: 
# Facial cellulitis - IV vanc; ID and ENT recs appreciated - CT max/facial reviewed # MRSA bacteremia 
 - IV vanc 
 - echo normal 
 
# Polysubstance abuse 
 - cessation advised # Elevated bilirubin 
 - resolved; normal 
 
Signed: 
Jeni Ballesteros MD

## 2018-10-11 PROBLEM — B19.20 HEPATITIS C: Status: ACTIVE | Noted: 2018-10-11

## 2018-10-11 LAB
BACTERIA SPEC CULT: ABNORMAL
BACTERIA SPEC CULT: ABNORMAL
BACTERIA SPEC CULT: NORMAL
GRAM STN SPEC: ABNORMAL
HCV RNA SERPL NAA+PROBE-ACNC: NORMAL IU/ML
HCV RNA SERPL NAA+PROBE-LOG IU: 6.72 LOG10 IU/ML
SERIAL MONITORING: NORMAL
SERVICE CMNT-IMP: ABNORMAL
SERVICE CMNT-IMP: NORMAL

## 2018-10-11 PROCEDURE — 74011250637 HC RX REV CODE- 250/637: Performed by: INTERNAL MEDICINE

## 2018-10-11 PROCEDURE — 65270000029 HC RM PRIVATE

## 2018-10-11 PROCEDURE — 74011250636 HC RX REV CODE- 250/636: Performed by: INTERNAL MEDICINE

## 2018-10-11 RX ADMIN — MORPHINE SULFATE 2 MG: 2 INJECTION, SOLUTION INTRAMUSCULAR; INTRAVENOUS at 23:51

## 2018-10-11 RX ADMIN — MORPHINE SULFATE 2 MG: 2 INJECTION, SOLUTION INTRAMUSCULAR; INTRAVENOUS at 00:16

## 2018-10-11 RX ADMIN — MORPHINE SULFATE 2 MG: 2 INJECTION, SOLUTION INTRAMUSCULAR; INTRAVENOUS at 12:28

## 2018-10-11 RX ADMIN — Medication 10 ML: at 21:15

## 2018-10-11 RX ADMIN — Medication 5 ML: at 13:59

## 2018-10-11 RX ADMIN — VANCOMYCIN HYDROCHLORIDE 1000 MG: 1 INJECTION, POWDER, LYOPHILIZED, FOR SOLUTION INTRAVENOUS at 23:44

## 2018-10-11 RX ADMIN — HYDROCODONE BITARTRATE AND ACETAMINOPHEN 2 TABLET: 5; 325 TABLET ORAL at 15:23

## 2018-10-11 RX ADMIN — HYDROCODONE BITARTRATE AND ACETAMINOPHEN 2 TABLET: 5; 325 TABLET ORAL at 02:27

## 2018-10-11 RX ADMIN — HYDROCODONE BITARTRATE AND ACETAMINOPHEN 2 TABLET: 5; 325 TABLET ORAL at 21:13

## 2018-10-11 RX ADMIN — Medication 10 ML: at 06:22

## 2018-10-11 RX ADMIN — VANCOMYCIN HYDROCHLORIDE 1000 MG: 1 INJECTION, POWDER, LYOPHILIZED, FOR SOLUTION INTRAVENOUS at 15:17

## 2018-10-11 RX ADMIN — VANCOMYCIN HYDROCHLORIDE 1000 MG: 1 INJECTION, POWDER, LYOPHILIZED, FOR SOLUTION INTRAVENOUS at 08:29

## 2018-10-11 RX ADMIN — Medication 10 ML: at 00:17

## 2018-10-11 RX ADMIN — VANCOMYCIN HYDROCHLORIDE 1000 MG: 1 INJECTION, POWDER, LYOPHILIZED, FOR SOLUTION INTRAVENOUS at 00:16

## 2018-10-11 RX ADMIN — MORPHINE SULFATE 2 MG: 2 INJECTION, SOLUTION INTRAMUSCULAR; INTRAVENOUS at 17:55

## 2018-10-11 RX ADMIN — MORPHINE SULFATE 2 MG: 2 INJECTION, SOLUTION INTRAMUSCULAR; INTRAVENOUS at 07:03

## 2018-10-11 RX ADMIN — HYDROCODONE BITARTRATE AND ACETAMINOPHEN 2 TABLET: 5; 325 TABLET ORAL at 08:38

## 2018-10-11 NOTE — PROGRESS NOTES
Hospitalist Progress Note Admit Date:  10/5/2018  1:56 PM  
Name:  Priscila Bailey Age:  43 y.o. 
:  1975 MRN:  [id number] PCP:  None Treatment Team: Attending Provider: Lance Vaughn MD; Consulting Provider: Asia Vitale MD; Utilization Review: Dianna Nails; Consulting Provider: Bereket Zarate MD; Care Manager: Aisha Rosa RN; Hospitalist: Nancy Boone MD 
 
Subjective:  
Ambulating in room today. Says he feels better. Still some drainage from chin. Wants to shower today. ROS otherwise negative. Objective:  
Patient Vitals for the past 24 hrs: 
 Temp Pulse Resp BP SpO2  
10/11/18 1114 98.5 °F (36.9 °C) 64 18 123/67 99 % 10/11/18 0724 97.4 °F (36.3 °C) 62 18 123/73 98 % 10/11/18 0330 97.7 °F (36.5 °C) 63 18 118/67 98 % 10/10/18 2335 98.4 °F (36.9 °C) 66 17 125/64 98 % 10/10/18 1915 98.1 °F (36.7 °C) 69 16 117/57 97 % 10/10/18 1539 99.3 °F (37.4 °C) 67 16 133/66 98 % Oxygen Therapy O2 Sat (%): 99 % (10/11/18 1114) O2 Device: Room air (10/11/18 0724) Intake/Output Summary (Last 24 hours) at 10/11/18 1359 Last data filed at 10/11/18 1114 Gross per 24 hour Intake            261.7 ml Output             1200 ml Net           -938. 3 ml  
   
 
General:    Well nourished. Alert. CV:   RRR. No murmur, rub, or gallop. Lungs:   CTAB. No wheezing, rhonchi, or rales. Abdomen:   Soft, nontender, nondistended. Extremities: Warm and dry. No cyanosis or edema. Skin:     Small erythematous lesion underneath lip near rt side of chin w serous drainage; crusting over lower lesion over lower rt chin and purulent drainage from inferior aspect of this lesion Neuro:  No gross focal deficits Data Review: 
I have reviewed all labs, meds, telemetry events, and studies from the last 24 hours: 
 
No results found for this or any previous visit (from the past 24 hour(s)). All Micro Results Procedure Component Value Units Date/Time CULTURE, BLOOD [[id number]] Collected:  10/06/18 1017 Order Status:  Completed Specimen:  Blood from Blood Updated:  10/11/18 0805 Special Requests: --     
  RIGHT 
ARM Culture result: NO GROWTH 5 DAYS     
 CULTURE, BLOOD [[id number]]  (Abnormal) Collected:  10/05/18 1544 Order Status:  Completed Specimen:  Blood from Blood Updated:  10/11/18 9417 Special Requests: --     
  LEFT Antecubital 
  
  GRAM STAIN      
  GRAM POS COCCI IN CLUSTERS  
        
  AEROBIC AND ANAEROBIC BOTTLES RESULTS VERIFIED, PHONED TO AND READ BACK BY Milka Cruz RN AT 5008 ON 10/6/18 SG  
   AEROBIC BOTTLE POSITIVE RESULTS VERIFIED, PHONED TO AND READ BACK BY DELILAH James AT 6671 ON 10/10/2018 BY CAM  
  Culture result: STAPHYLOCOCCUS AUREUS (A) For Susceptibility Refer to Culture [address] [facility] [id number] CULTURE, BLOOD [[id number]]  (Abnormal)  (Susceptibility) Collected:  10/05/18 1545 Order Status:  Completed Specimen:  Blood from Blood Updated:  10/09/18 1005 Special Requests: --     
  RIGHT Antecubital 
  
  GRAM STAIN GRAM POSITIVE RODS ANAEROBIC BOTTLE POSITIVE RESULTS VERIFIED, PHONED TO AND READ BACK BY Eric WILD RN AT 0291 ON 10/6/18 SG  
        
  GRAM POS COCCI IN CLUSTERS  
   AEROBIC BOTTLE POSITIVE RESULTS VERIFIED, PHONED TO AND READ BACK BY EMILY HARDIN @ 4703 ON [id number] BY Lenny Cortez Culture result:      
  BACILLUS SPECIES ANAEROBIC BOTTLE POSITIVE THIS ORGANISM MAY BE INDICATIVE OF CULTURE CONTAMINATION, HOWEVER, CLINICAL CORRELATION NEEDS TO BE EVALUATED, AS EACH CASE IS UNIQUE. (A)  
        
  **METHICILLIN RESISTANT STAPHYLOCOCCUS AUREUS** (A) MRSA target DNA sequences detected:  SA target DNA sequence detected within the sample. Test performed by PCR   Culture/Sensitivity to follow (A)   RESULTS VERIFIED, PHONED TO AND READ BACK BY 
 Brittany Alert ON 10/07/18 @1912, ADS RESULTS VERIFIED, PHONED TO AND READ BACK BY 
Carrol Terry RN AT 4355 10.9.18 CW Use Rifampin only in conjunction with another antimicrobial agent. Do not use as Monotherapy. Results for orders placed or performed during the hospital encounter of 10/05/18  
2D ECHO LIMTED ADULT W OR WO CONTR Narrative [facility] [address] [place], [address] [facility] 
[phone] Transthoracic Echocardiogram 
2D, M-mode, Doppler, and Color Doppler Patient: Dariusz Gonzalez 
MR #: [id number] : 1975 Age: 43 years Gender: Male Study date: 08-Oct-2018 Account #: [de-identified] Height: 65.7 in 
Weight: 159.7 lb 
BSA: 1.81 mï¾² Status:Routine Location: 201 BP: 160/ 90 Allergies: NO KNOWN ALLERGIES Sonographer:  Tianna Cruz RD Group:  [address] Cardiology Referring Physician:  Maryann Caban NP Reading Physician:  Cristobal Tejada. Branden Starks MD [facility] [place] INDICATIONS: Bacterial ; MRSA bacteremia Concern for IE. PROCEDURE: This was a routine study. A transthoracic echocardiogram was 
performed. The study included complete 2D imaging, M-mode, complete spectral 
Doppler, and color Doppler. Image quality was good. LEFT VENTRICLE: Size was normal. Systolic function was normal. Ejection 
fraction was estimated in the range of 55 % to 60 %. There were no regional 
wall motion abnormalities. Wall thickness was normal. The E/e' ratio was 6.95. Left ventricular diastolic function parameters were normal. 
 
RIGHT VENTRICLE: The size was normal. Systolic function was normal. The 
tricuspid jet envelope definition was inadequate for estimation of RV  
systolic 
pressure. LEFT ATRIUM: Size was normal. 
 
RIGHT ATRIUM: Size was normal. 
 
SYSTEMIC VEINS: IVC: The inferior vena cava was normal in size. The 
respirophasic change in diameter was less than 50%. AORTIC VALVE: The valve was structurally normal, tri-commissural. There was  
no 
evidence for vegetation. There was no evidence for stenosis. There was no 
insufficiency. MITRAL VALVE: Valve structure was normal. There was no evidence for  
vegetation. There was no evidence for stenosis. There was trivial regurgitation. TRICUSPID VALVE: The valve structure was normal. There was no evidence for 
vegetation. There was no evidence for stenosis. There was trivial  
regurgitation. PULMONIC VALVE: The valve structure was normal. There was no evidence for 
vegetation. There was no evidence for stenosis. There was no insufficiency. PERICARDIUM: There was no pericardial effusion. AORTA: The root exhibited upper limit of normal size. SUMMARY: 
 
-  Left ventricle: Systolic function was normal. Ejection fraction was 
estimated in the range of 55 % to 60 %. There were no regional wall motion 
abnormalities. -  Inferior vena cava, hepatic veins: The respirophasic change in diameter  
was 
less than 50%. -  Aortic valve: There was no evidence for vegetation. 
 
-  Mitral valve: There was no evidence for vegetation. SYSTEM MEASUREMENT TABLES 
 
2D mode AoR Diam (2D): 3.8 cm 
LA Dimension (2D): 3.4 cm IVS/LVPW (2D): 1 IVSd (2D): 1.1 cm 
LVIDd (2D): 4.3 cm LVIDs (2D): 2.7 cm 
LVOT Area (2D): 3.5 cm2 LVPWd (2D): 1.1 cm RVIDd (2D): 3.1 cm Tissue Doppler Imaging LV Peak Early Plascencia Tissue Alexandru; Lateral MA (TDI): 12.5 cm/s LV Peak Early Plascencia Tissue Alexandru; Medial MA (TDI): 8.4 cm/s Unspecified Scan Mode Peak Grad; Mean; Antegrade Flow: 6 mm[Hg] Vmax; Antegrade Flow: 125 cm/s LVOT Diam: 2.1 cm 
MV Peak Alexandru/LV Peak Tissue Alexandru E-Wave; Lateral MA: 5.6 MV Peak Alexandru/LV Peak Tissue Alexandru E-Wave; Medial MA: 8.3 Prepared and signed by 
 
Ramon Tapia. Eun Lara MD [facility] [place] Signed 08-Oct-2018 17:03:59 Current Meds: 
Current Facility-Administered Medications Medication Dose Route Frequency  [START ON 10/12/2018] Vancomycin Trough Level Reminder   Other ONCE  
 vancomycin (VANCOCIN) 1,000 mg in 0.9% sodium chloride (MBP/ADV) 250 mL  1,000 mg IntraVENous Q8H  
 HYDROcodone-acetaminophen (NORCO) 5-325 mg per tablet 2 Tab  2 Tab Oral Q6H PRN  
 ibuprofen (MOTRIN) tablet 400 mg  400 mg Oral Q6H PRN  
 sodium chloride (NS) flush 5-10 mL  5-10 mL IntraVENous Q8H  
 sodium chloride (NS) flush 5-10 mL  5-10 mL IntraVENous PRN  
 acetaminophen (TYLENOL) tablet 650 mg  650 mg Oral Q6H PRN  
 morphine injection 2 mg  2 mg IntraVENous Q4H PRN  
 naloxone (NARCAN) injection 0.4 mg  0.4 mg IntraVENous PRN  
 ondansetron (ZOFRAN) injection 4 mg  4 mg IntraVENous Q4H PRN  
 nicotine (NICODERM CQ) 21 mg/24 hr patch 1 Patch  1 Patch TransDERmal DAILY  influenza vaccine 2018-19 (6 mos+)(PF) (FLUARIX QUAD/FLULAVAL QUAD) injection 0.5 mL  0.5 mL IntraMUSCular PRIOR TO DISCHARGE Other Studies (last 24 hours): No results found. Assessment and Plan:  
 
Hospital Problems as of 10/11/2018  Never Reviewed Codes Class Noted - Resolved POA MRSA bacteremia ICD-10-CM: R78.81 ICD-9-CM: 790.7, 041.12  10/10/2018 - Present Yes * (Principal)Cellulitis ICD-10-CM: L03.90 ICD-9-CM: 682.9  10/5/2018 - Present Yes Oral abscess ICD-10-CM: K12.2 ICD-9-CM: 528.3  10/5/2018 - Present Yes Elevated bilirubin ICD-10-CM: R17 
ICD-9-CM: 277.4  10/5/2018 - Present Yes Tobacco use (Chronic) ICD-10-CM: Z72.0 ICD-9-CM: 305.1  10/5/2018 - Present Yes Plan: # Facial cellulitis - IV vanc; ID and ENT recs appreciated - CT max/facial reviewed 
  - HIV neg 
  
# MRSA bacteremia 
                      - con't IV vanc 
                      - echo normal 
  
# Polysubstance abuse 
                      - cessation advised # HepC - Viral load pending # Elevated bilirubin 
                      - resolved; normal 
 
 DC planning/Dispo: Stable for dc next 24-48 hrs pending appropriate outpt antibiotic regimen. Con't monitoring facial lesion in the meantime. Diet:  DIET REGULAR 
DVT ppx:  ambulation Signed: 
Shashank Laar MD

## 2018-10-11 NOTE — PROGRESS NOTES
Infectious Disease Progress Note Today's Date: 10/11/2018 Admit Date: 10/5/2018 Impression: · MRSA, Bacillus species bacteremia (10/5); repeat BC (10/6) NGTD. TTE NG 
· Facial cellulitis/2.8 cm subcutaneous fluid collection at the right anterolateral aspect of the mandible resolving · Tobacco abuse · UDS (+) Opioids & Amphetamines but denies IVDU · Acute bilateral maculopapular inner thigh and lower flank rash; ?drug eruption - stable/improved · HCV Ab positive Plan:  
· Continue IV vancomycin at this time. We could opt to give a dose of dalbavancin tomorrow if the goal is discharge - need to discuss options with pharmacy. Generally speaking we like to have 2 weeks of highly bioavailable and cidal treatment for MRSA bacteremia. His case is unusual, though. I would like to see a bit more improvement in the facial abscess before we plan discharge regardless. · HCV RNA is pending for evaluation of HCV infection. · Continue warm compresses to face. Anti-infectives: · IV Vancomycin (10/7- 
· IV Clindamycin (10/5-10/7) · IV Zosyn (10/7-10/8) Subjective: In no acute distress; afebrile; still draining from chin; Allergies Allergen Reactions  Penicillins Rash Review of Systems:  A comprehensive review of systems was negative except for that written in the History of Present Illness. Objective:  
 
Visit Vitals  /73  Pulse 62  Temp 97.4 °F (36.3 °C)  Resp 18  Ht 5' 6\" (1.676 m)  Wt 72.6 kg (160 lb)  SpO2 98%  BMI 25.82 kg/m2 Temp (24hrs), Av.1 °F (36.7 °C), Min:97.4 °F (36.3 °C), Max:99.3 °F (37.4 °C) Lines:  Peripheral IV:   
   
 
 
Physical Exam:   
General:  Alert, cooperative, in no distress Eyes:  Sclera anicteric Mouth/Throat: Mucous membranes normal, oral pharynx clear, poor dentition Neck: Supple Lungs:   Clear to auscultation bilaterally, good effort CV:  Regular rate and rhythm,no murmur, click, rub or gallop Abdomen:   Soft, non-tender. bowel sounds normal. non-distended Extremities: No cyanosis or edema Skin: Ongoing purulent drainage from chin Lymph nodes: Musculoskeletal: No swelling or deformity Lines/Devices:  Intact, no erythema, drainage or tenderness Psych: Alert and oriented, normal mood affect given the setting Data Review: CBC: 
Recent Labs 10/09/18 
 0408 WBC  10.4 GRANS  62 MONOS  8  
EOS  4 ANEU  6.5 ABL  2.6 HGB  15.7 HCT  46.1 PLT  313 BMP: 
Recent Labs 10/09/18 
 0408 CREA  0.73* BUN  7  
NA  139  
K  4.0  
CL  106 CO2  26 AGAP  7 GLU  102* LFTS: 
Recent Labs 10/09/18 
 0408 TBILI  0.3 ALT  31 SGOT  18  
AP  77  
TP  6.9 ALB  2.9* Microbiology:  
 
All Micro Results Procedure Component Value Units Date/Time CULTURE, BLOOD [[id number]] Collected:  10/06/18 1017 Order Status:  Completed Specimen:  Blood from Blood Updated:  10/11/18 0805 Special Requests: --     
  RIGHT 
ARM Culture result: NO GROWTH 5 DAYS     
 CULTURE, BLOOD [[id number]]  (Abnormal) Collected:  10/05/18 3754 Order Status:  Completed Specimen:  Blood from Blood Updated:  10/11/18 1456 Special Requests: --     
  LEFT Antecubital 
  
  GRAM STAIN      
  GRAM POS COCCI IN CLUSTERS  
        
  AEROBIC AND ANAEROBIC BOTTLES RESULTS VERIFIED, PHONED TO AND READ BACK BY Tico Ferris RN AT 7441 ON 10/6/18 SG  
   AEROBIC BOTTLE POSITIVE RESULTS VERIFIED, PHONED TO AND READ BACK BY DELILAH Lynn AT 4806 ON 10/10/2018 BY CAM  
  Culture result: STAPHYLOCOCCUS AUREUS (A) For Susceptibility Refer to Culture [address] [id number] CULTURE, BLOOD [[id number]]  (Abnormal)  (Susceptibility) Collected:  10/05/18 1545 Order Status:  Completed Specimen:  Blood from Blood Updated:  10/09/18 1005 Special Requests: --     
  RIGHT Antecubital 
  
  GRAM STAIN GRAM POSITIVE RODS ANAEROBIC BOTTLE POSITIVE RESULTS VERIFIED, PHONED TO AND READ BACK BY Juwan WILD RN AT 1040 ON 10/6/18 SG  
        
  GRAM POS COCCI IN CLUSTERS  
   AEROBIC BOTTLE POSITIVE RESULTS VERIFIED, PHONED TO AND READ BACK BY WILFREDRN @ 3960 ON [id number] BY Shanell Dobson Culture result:      
  BACILLUS SPECIES ANAEROBIC BOTTLE POSITIVE THIS ORGANISM MAY BE INDICATIVE OF CULTURE CONTAMINATION, HOWEVER, CLINICAL CORRELATION NEEDS TO BE EVALUATED, AS EACH CASE IS UNIQUE. (A)  
        
  **METHICILLIN RESISTANT STAPHYLOCOCCUS AUREUS** (A) MRSA target DNA sequences detected:  SA target DNA sequence detected within the sample. Test performed by PCR   Culture/Sensitivity to follow (A) RESULTS VERIFIED, PHONED TO AND READ BACK BY 
Radha García ON 10/07/18 @0637, ADS RESULTS VERIFIED, PHONED TO AND READ BACK BY 
Nelsy Cedeño RN AT 4588 10.9.18 CW Use Rifampin only in conjunction with another antimicrobial agent. Do not use as Monotherapy. Imaging: No new imaging Signed By: Liz Nickerson MD   
 October 11, 2018

## 2018-10-11 NOTE — PROGRESS NOTES
Hourly rounds call light in reach non skid socks. Pt has been receiving pain medication and iv antibiotics throughout the night. Pt resting comfortably in bed

## 2018-10-12 LAB
ANION GAP SERPL CALC-SCNC: 8 MMOL/L (ref 7–16)
BASOPHILS # BLD: 0.1 K/UL (ref 0–0.2)
BASOPHILS NFR BLD: 1 % (ref 0–2)
BUN SERPL-MCNC: 15 MG/DL (ref 6–23)
CALCIUM SERPL-MCNC: 8.5 MG/DL (ref 8.3–10.4)
CHLORIDE SERPL-SCNC: 107 MMOL/L (ref 98–107)
CO2 SERPL-SCNC: 26 MMOL/L (ref 21–32)
CREAT SERPL-MCNC: 0.85 MG/DL (ref 0.8–1.5)
DIFFERENTIAL METHOD BLD: NORMAL
EOSINOPHIL # BLD: 0.4 K/UL (ref 0–0.8)
EOSINOPHIL NFR BLD: 6 % (ref 0.5–7.8)
ERYTHROCYTE [DISTWIDTH] IN BLOOD BY AUTOMATED COUNT: 12 %
GLUCOSE SERPL-MCNC: 95 MG/DL (ref 65–100)
HCT VFR BLD AUTO: 42.7 % (ref 41.1–50.3)
HGB BLD-MCNC: 14.6 G/DL (ref 13.6–17.2)
IMM GRANULOCYTES # BLD: 0.1 K/UL (ref 0–0.5)
IMM GRANULOCYTES NFR BLD AUTO: 1 % (ref 0–5)
LYMPHOCYTES # BLD: 2.3 K/UL (ref 0.5–4.6)
LYMPHOCYTES NFR BLD: 30 % (ref 13–44)
MCH RBC QN AUTO: 31.7 PG (ref 26.1–32.9)
MCHC RBC AUTO-ENTMCNC: 34.2 G/DL (ref 31.4–35)
MCV RBC AUTO: 92.8 FL (ref 79.6–97.8)
MONOCYTES # BLD: 0.7 K/UL (ref 0.1–1.3)
MONOCYTES NFR BLD: 10 % (ref 4–12)
NEUTS SEG # BLD: 4 K/UL (ref 1.7–8.2)
NEUTS SEG NFR BLD: 53 % (ref 43–78)
NRBC # BLD: 0 K/UL (ref 0–0.2)
PLATELET # BLD AUTO: 323 K/UL (ref 150–450)
PMV BLD AUTO: 9.7 FL (ref 9.4–12.3)
POTASSIUM SERPL-SCNC: 3.9 MMOL/L (ref 3.5–5.1)
RBC # BLD AUTO: 4.6 M/UL (ref 4.23–5.6)
SODIUM SERPL-SCNC: 141 MMOL/L (ref 136–145)
VANCOMYCIN TROUGH SERPL-MCNC: 17.4 UG/ML (ref 5–20)
WBC # BLD AUTO: 7.5 K/UL (ref 4.3–11.1)

## 2018-10-12 PROCEDURE — 85025 COMPLETE CBC W/AUTO DIFF WBC: CPT

## 2018-10-12 PROCEDURE — 36415 COLL VENOUS BLD VENIPUNCTURE: CPT

## 2018-10-12 PROCEDURE — 65270000029 HC RM PRIVATE

## 2018-10-12 PROCEDURE — 74011250636 HC RX REV CODE- 250/636: Performed by: INTERNAL MEDICINE

## 2018-10-12 PROCEDURE — 74011250637 HC RX REV CODE- 250/637: Performed by: INTERNAL MEDICINE

## 2018-10-12 PROCEDURE — 80202 ASSAY OF VANCOMYCIN: CPT

## 2018-10-12 PROCEDURE — 80048 BASIC METABOLIC PNL TOTAL CA: CPT

## 2018-10-12 RX ADMIN — MORPHINE SULFATE 2 MG: 2 INJECTION, SOLUTION INTRAMUSCULAR; INTRAVENOUS at 18:29

## 2018-10-12 RX ADMIN — VANCOMYCIN HYDROCHLORIDE 1000 MG: 1 INJECTION, POWDER, LYOPHILIZED, FOR SOLUTION INTRAVENOUS at 08:12

## 2018-10-12 RX ADMIN — VANCOMYCIN HYDROCHLORIDE 1000 MG: 1 INJECTION, POWDER, LYOPHILIZED, FOR SOLUTION INTRAVENOUS at 23:23

## 2018-10-12 RX ADMIN — MORPHINE SULFATE 2 MG: 2 INJECTION, SOLUTION INTRAMUSCULAR; INTRAVENOUS at 06:17

## 2018-10-12 RX ADMIN — HYDROCODONE BITARTRATE AND ACETAMINOPHEN 2 TABLET: 5; 325 TABLET ORAL at 10:33

## 2018-10-12 RX ADMIN — Medication 10 ML: at 21:08

## 2018-10-12 RX ADMIN — Medication 10 ML: at 06:12

## 2018-10-12 RX ADMIN — HYDROCODONE BITARTRATE AND ACETAMINOPHEN 2 TABLET: 5; 325 TABLET ORAL at 03:52

## 2018-10-12 RX ADMIN — IBUPROFEN 400 MG: 400 TABLET ORAL at 02:18

## 2018-10-12 RX ADMIN — IBUPROFEN 400 MG: 400 TABLET ORAL at 21:07

## 2018-10-12 RX ADMIN — IBUPROFEN 400 MG: 400 TABLET ORAL at 08:21

## 2018-10-12 RX ADMIN — Medication 5 ML: at 14:00

## 2018-10-12 RX ADMIN — VANCOMYCIN HYDROCHLORIDE 1000 MG: 1 INJECTION, POWDER, LYOPHILIZED, FOR SOLUTION INTRAVENOUS at 16:02

## 2018-10-12 RX ADMIN — HYDROCODONE BITARTRATE AND ACETAMINOPHEN 2 TABLET: 5; 325 TABLET ORAL at 22:08

## 2018-10-12 RX ADMIN — MORPHINE SULFATE 2 MG: 2 INJECTION, SOLUTION INTRAMUSCULAR; INTRAVENOUS at 12:46

## 2018-10-12 RX ADMIN — Medication 10 ML: at 06:17

## 2018-10-12 RX ADMIN — HYDROCODONE BITARTRATE AND ACETAMINOPHEN 2 TABLET: 5; 325 TABLET ORAL at 16:05

## 2018-10-12 NOTE — PROGRESS NOTES
Problem: Nutrition Deficit Goal: *Optimize nutritional status Nutrition LOS Note:  
Assessment Diet order(s): 10-5: Full liquid, 10-8: Regular Food,Nutrition, and Pertinent History: Pt reports good appetite and eating 100% of all meals Anthropometrics: Height: 5' 6\" (167.6 cm),  Weight: 72.6 kg (160 lb)-unspecified source, Body mass index is 25.82 kg/(m^2). BMI class of overweight. Macronutrient Needs: 
· EER:  7886-9648 kcal /day (25-30 kcal/kg listed BW) · EPR:  73-87 grams protein/day (1-1.2 grams/kg IBW) Intake/Comparative Standards:  Average intake for past 7 day(s)/5 recorded meal(s): 93%. This potentially meets ~100% of kcal and >100% of protein needs Nutrition Diagnosis: No nutrition diagnosis at this time Intervention:  
Meals and snacks: Continue current diet. Discharge nutrition plan: No discharge needs identified. Kindra Buck, [address] [place] [address], [facility] [place] [facility]

## 2018-10-12 NOTE — ROUTINE PROCESS
END OF SHIFT NOTE: 
 
INTAKE/OUTPUT 
10/11 0701 - 10/12 0700 In: 1955.9 [P.O.:1920; I.V.:35.9] Out: 8989 [Urine:4185] Voiding: YES Catheter: NO 
Drain:   
 
 
 
 
 
Flatus: Patient does have flatus present. Stool:  0 occurrences. Characteristics: 
  
 
Emesis: 0 occurrences. Characteristics: VITAL SIGNS Patient Vitals for the past 12 hrs: 
 Temp Pulse Resp BP SpO2  
10/12/18 0352 98 °F (36.7 °C) 60 18 119/69 96 % 10/11/18 2344 98 °F (36.7 °C) 68 18 108/45 95 % 10/11/18 1949 98.7 °F (37.1 °C) 64 18 120/67 94 % Pain Assessment Pain Intensity 1: 0 (10/12/18 0453) Pain Location 1: Jaw Pain Intervention(s) 1: Medication (see MAR) Patient Stated Pain Goal: 0 Ambulating Yes Shift report given to oncoming nurse at the bedside.  
 
Serena Gary LPN

## 2018-10-12 NOTE — PROGRESS NOTES
Hospitalist Progress Note Admit Date:  10/5/2018  1:56 PM  
Name:  Lori Peak Age:  43 y.o. 
:  1975 MRN:  [id number] PCP:  None Treatment Team: Attending Provider: Alvarado Zheng MD; Consulting Provider: Bernardo Elder MD; Utilization Review: Argenis Chowdhury; Consulting Provider: Minerva Montoya MD; Care Manager: Dain Strauss RN; Hospitalist: Diamond Fu MD 
 
Subjective:  
Swelling still improving, yet slowly. Still some chin drainage. No recurrent fevers or rash. Labs normal. Appetite is good. Hopeful to get dalbavancin then d/c within next 24 hrs. ROS otherwise negative. Objective:  
Patient Vitals for the past 24 hrs: 
 Temp Pulse Resp BP SpO2  
10/12/18 1125 98.9 °F (37.2 °C) 69 18 117/69 99 % 10/12/18 0722 98.1 °F (36.7 °C) 64 18 122/63 95 % 10/12/18 0352 98 °F (36.7 °C) 60 18 119/69 96 % 10/11/18 2344 98 °F (36.7 °C) 68 18 108/45 95 % 10/11/18 1949 98.7 °F (37.1 °C) 64 18 120/67 94 % 10/11/18 1541 98.5 °F (36.9 °C) 69 18 135/58 100 % Oxygen Therapy O2 Sat (%): 99 % (10/12/18 1125) O2 Device: Room air (10/11/18 0724) Intake/Output Summary (Last 24 hours) at 10/12/18 1517 Last data filed at 10/12/18 1036 Gross per 24 hour Intake           1955.9 ml Output             2775 ml Net           -819.1 ml  
   
 
General:    Well nourished. Alert. CV:   RRR. No murmur, rub, or gallop. Lungs:   CTAB. No wheezing, rhonchi, or rales. Abdomen:   Soft, nontender, nondistended. Extremities: Warm and dry. No cyanosis or edema. Skin:     Small erythematous lesion underneath lip near mid chin w serous drainage; crusting over lower lesion near rt chin, small amt of purulent drainage from inferior aspect of this lesion Neuro:  No gross focal deficits Data Review: 
I have reviewed all labs, meds, telemetry events, and studies from the last 24 hours: 
 
Recent Results (from the past 24 hour(s)) CBC WITH AUTOMATED DIFF  
 Collection Time: 10/12/18  3:50 AM  
Result Value Ref Range WBC 7.5 4.3 - 11.1 K/uL  
 RBC 4.60 4.23 - 5.6 M/uL  
 HGB 14.6 13.6 - 17.2 g/dL HCT 42.7 41.1 - 50.3 % MCV 92.8 79.6 - 97.8 FL  
 MCH 31.7 26.1 - 32.9 PG  
 MCHC 34.2 31.4 - 35.0 g/dL  
 RDW 12.0 % PLATELET 154 667 - 097 K/uL MPV 9.7 9.4 - 12.3 FL ABSOLUTE NRBC 0.00 0.0 - 0.2 K/uL  
 DF AUTOMATED NEUTROPHILS 53 43 - 78 % LYMPHOCYTES 30 13 - 44 % MONOCYTES 10 4.0 - 12.0 % EOSINOPHILS 6 0.5 - 7.8 % BASOPHILS 1 0.0 - 2.0 % IMMATURE GRANULOCYTES 1 0.0 - 5.0 %  
 ABS. NEUTROPHILS 4.0 1.7 - 8.2 K/UL  
 ABS. LYMPHOCYTES 2.3 0.5 - 4.6 K/UL  
 ABS. MONOCYTES 0.7 0.1 - 1.3 K/UL  
 ABS. EOSINOPHILS 0.4 0.0 - 0.8 K/UL  
 ABS. BASOPHILS 0.1 0.0 - 0.2 K/UL  
 ABS. IMM. GRANS. 0.1 0.0 - 0.5 K/UL METABOLIC PANEL, BASIC Collection Time: 10/12/18  3:50 AM  
Result Value Ref Range Sodium 141 136 - 145 mmol/L Potassium 3.9 3.5 - 5.1 mmol/L Chloride 107 98 - 107 mmol/L  
 CO2 26 21 - 32 mmol/L Anion gap 8 7 - 16 mmol/L Glucose 95 65 - 100 mg/dL BUN 15 6 - 23 MG/DL Creatinine 0.85 0.8 - 1.5 MG/DL  
 GFR est AA >60 >60 ml/min/1.73m2 GFR est non-AA >60 >60 ml/min/1.73m2 Calcium 8.5 8.3 - 10.4 MG/DL Siddhartha Lo Collection Time: 10/12/18  7:11 AM  
Result Value Ref Range Vancomycin,trough 17.4 5 - 20 ug/mL All Micro Results Procedure Component Value Units Date/Time CULTURE, BLOOD [[id number]] Collected:  10/06/18 1017 Order Status:  Completed Specimen:  Blood from Blood Updated:  10/11/18 0805 Special Requests: --     
  RIGHT 
ARM Culture result: NO GROWTH 5 DAYS     
 CULTURE, BLOOD [[id number]]  (Abnormal) Collected:  10/05/18 1544 Order Status:  Completed Specimen:  Blood from Blood Updated:  10/11/18 1950 Special Requests: --     
  LEFT Antecubital 
  
  GRAM STAIN      
  GRAM POS COCCI IN CLUSTERS  
        
  AEROBIC AND ANAEROBIC BOTTLES  
        
 RESULTS VERIFIED, PHONED TO AND READ BACK BY Jennifer Monreal RN AT 0922 ON 10/6/18 SG  
   AEROBIC BOTTLE POSITIVE RESULTS VERIFIED, PHONED TO AND READ BACK BY DELILAH Mir AT 1592 ON 10/10/2018 BY CAM  
  Culture result: STAPHYLOCOCCUS AUREUS (A) For Susceptibility Refer to Culture [facility] [id number] CULTURE, BLOOD [[id number]]  (Abnormal)  (Susceptibility) Collected:  10/05/18 1545 Order Status:  Completed Specimen:  Blood from Blood Updated:  10/09/18 1005 Special Requests: --     
  RIGHT Antecubital 
  
  GRAM STAIN GRAM POSITIVE RODS ANAEROBIC BOTTLE POSITIVE RESULTS VERIFIED, PHONED TO AND READ BACK BY Eileen WILD RN AT 0233 ON 10/6/18 SG  
        
  GRAM POS COCCI IN CLUSTERS  
   AEROBIC BOTTLE POSITIVE RESULTS VERIFIED, PHONED TO AND READ BACK BY EMILY HARDIN @ 6769 ON [id number] BY Christy Gamez Culture result:      
  BACILLUS SPECIES ANAEROBIC BOTTLE POSITIVE THIS ORGANISM MAY BE INDICATIVE OF CULTURE CONTAMINATION, HOWEVER, CLINICAL CORRELATION NEEDS TO BE EVALUATED, AS EACH CASE IS UNIQUE. (A)  
        
  **METHICILLIN RESISTANT STAPHYLOCOCCUS AUREUS** (A) MRSA target DNA sequences detected:  SA target DNA sequence detected within the sample. Test performed by PCR   Culture/Sensitivity to follow (A) RESULTS VERIFIED, PHONED TO AND READ BACK BY 
Linden Montano ON 10/07/18 @0637, ADS RESULTS VERIFIED, PHONED TO AND READ BACK BY 
Ana Blanco RN AT 6498 10.9.18 CW Use Rifampin only in conjunction with another antimicrobial agent. Do not use as Monotherapy. Results for orders placed or performed during the hospital encounter of 10/05/18  
2D ECHO LIMTED ADULT W OR WO CONTR Narrative Imelda One [address] [place], [address] [facility] 
[phone] Transthoracic Echocardiogram 
2D, M-mode, Doppler, and Color Doppler Patient: Janny Barrera 
MR #: [id number] : 1975 Age: 43 years Gender: Male Study date: 08-Oct-2018 Account #: [de-identified] Height: 65.7 in 
Weight: 159.7 lb 
BSA: 1.81 mï¾² Status:Routine Location: 201 BP: 160/ 90 Allergies: NO KNOWN ALLERGIES Sonographer:  Jessie Bishop [facility] Group:  [facility] Referring Physician:  Daquan Watt NP Reading Physician:  Jackie Vides. Michael Rowe MD [facility] INDICATIONS: Bacterial ; MRSA bacteremia Concern for IE. PROCEDURE: This was a routine study. A transthoracic echocardiogram was 
performed. The study included complete 2D imaging, M-mode, complete spectral 
Doppler, and color Doppler. Image quality was good. LEFT VENTRICLE: Size was normal. Systolic function was normal. Ejection 
fraction was estimated in the range of 55 % to 60 %. There were no regional 
wall motion abnormalities. Wall thickness was normal. The E/e' ratio was 6.95. Left ventricular diastolic function parameters were normal. 
 
RIGHT VENTRICLE: The size was normal. Systolic function was normal. The 
tricuspid jet envelope definition was inadequate for estimation of RV  
systolic 
pressure. LEFT ATRIUM: Size was normal. 
 
RIGHT ATRIUM: Size was normal. 
 
SYSTEMIC VEINS: IVC: The inferior vena cava was normal in size. The 
respirophasic change in diameter was less than 50%. AORTIC VALVE: The valve was structurally normal, tri-commissural. There was  
no 
evidence for vegetation. There was no evidence for stenosis. There was no 
insufficiency. MITRAL VALVE: Valve structure was normal. There was no evidence for  
vegetation. There was no evidence for stenosis. There was trivial regurgitation. TRICUSPID VALVE: The valve structure was normal. There was no evidence for 
vegetation. There was no evidence for stenosis. There was trivial  
regurgitation. PULMONIC VALVE: The valve structure was normal. There was no evidence for vegetation. There was no evidence for stenosis. There was no insufficiency. PERICARDIUM: There was no pericardial effusion. AORTA: The root exhibited upper limit of normal size. SUMMARY: 
 
-  Left ventricle: Systolic function was normal. Ejection fraction was 
estimated in the range of 55 % to 60 %. There were no regional wall motion 
abnormalities. -  Inferior vena cava, hepatic veins: The respirophasic change in diameter  
was 
less than 50%. -  Aortic valve: There was no evidence for vegetation. 
 
-  Mitral valve: There was no evidence for vegetation. SYSTEM MEASUREMENT TABLES 
 
2D mode AoR Diam (2D): 3.8 cm 
LA Dimension (2D): 3.4 cm IVS/LVPW (2D): 1 IVSd (2D): 1.1 cm 
LVIDd (2D): 4.3 cm LVIDs (2D): 2.7 cm 
LVOT Area (2D): 3.5 cm2 LVPWd (2D): 1.1 cm RVIDd (2D): 3.1 cm Tissue Doppler Imaging LV Peak Early Plascencia Tissue Alexandru; Lateral MA (TDI): 12.5 cm/s LV Peak Early Plascencia Tissue Alexandru; Medial MA (TDI): 8.4 cm/s Unspecified Scan Mode Peak Grad; Mean; Antegrade Flow: 6 mm[Hg] Vmax; Antegrade Flow: 125 cm/s LVOT Diam: 2.1 cm 
MV Peak Alexandru/LV Peak Tissue Alexandru E-Wave; Lateral MA: 5.6 MV Peak Alexandru/LV Peak Tissue Alexandru E-Wave; Medial MA: 8.3 Prepared and signed by 
 
Letty Catherine. Ramya Woods MD [facility] Signed 08-Oct-2018 17:03:59 Current Meds: 
Current Facility-Administered Medications Medication Dose Route Frequency  vancomycin (VANCOCIN) 1,000 mg in 0.9% sodium chloride (MBP/ADV) 250 mL  1,000 mg IntraVENous Q8H  
 HYDROcodone-acetaminophen (NORCO) 5-325 mg per tablet 2 Tab  2 Tab Oral Q6H PRN  
 ibuprofen (MOTRIN) tablet 400 mg  400 mg Oral Q6H PRN  
 sodium chloride (NS) flush 5-10 mL  5-10 mL IntraVENous Q8H  
 sodium chloride (NS) flush 5-10 mL  5-10 mL IntraVENous PRN  
 acetaminophen (TYLENOL) tablet 650 mg  650 mg Oral Q6H PRN  
 morphine injection 2 mg  2 mg IntraVENous Q4H PRN  
 naloxone (NARCAN) injection 0.4 mg  0.4 mg IntraVENous PRN  
  ondansetron (ZOFRAN) injection 4 mg  4 mg IntraVENous Q4H PRN  
 nicotine (NICODERM CQ) 21 mg/24 hr patch 1 Patch  1 Patch TransDERmal DAILY  influenza vaccine 2018-19 (6 mos+)(PF) (FLUARIX QUAD/FLULAVAL QUAD) injection 0.5 mL  0.5 mL IntraMUSCular PRIOR TO DISCHARGE Other Studies (last 24 hours): No results found. Assessment and Plan:  
 
Hospital Problems as of 10/12/2018  Never Reviewed Codes Class Noted - Resolved POA Hepatitis C ICD-10-CM: B19.20 ICD-9-CM: 070.70  10/11/2018 - Present Unknown MRSA bacteremia ICD-10-CM: R78.81 ICD-9-CM: 790.7, 041.12  10/10/2018 - Present Yes * (Principal)Cellulitis ICD-10-CM: L03.90 ICD-9-CM: 682.9  10/5/2018 - Present Yes Oral abscess ICD-10-CM: K12.2 ICD-9-CM: 528.3  10/5/2018 - Present Yes Elevated bilirubin ICD-10-CM: R17 
ICD-9-CM: 277.4  10/5/2018 - Present Yes Tobacco use (Chronic) ICD-10-CM: Z72.0 ICD-9-CM: 305.1  10/5/2018 - Present Yes Plan: # MRSA bacteremia                       - con't IV vanc; echo normal 
  - hopefully can get dalbavancin x1 tomorrow which would cover the remaining duration of his bacteremia tx # Facial cellulitis                       - IV vanc; ID and ENT recs appreciated - CT max/facial reviewed 
                      - HIV neg 
   
# Polysubstance abuse 
                      - cessation advised 
  
# HepC - Viral load pending 
  
# Elevated bilirubin 
                      - resolved; normal 
 
Hopeful to dc within next 24 hrs. DC planning/Dispo:   
Diet:  DIET REGULAR 
DVT ppx:  ambulation Signed: 
Cristel Clayton MD

## 2018-10-12 NOTE — PROGRESS NOTES
Pharmacokinetic Consult to Pharmacist 
 
Keily Briceno is a 43 y.o. male being treated for MRSA, Bacillus species bacteremia and facial cellulitis with vancomycin. Height: 5' 6\" (167.6 cm)  Weight: 72.6 kg (160 lb) Lab Results Component Value Date/Time BUN 15 10/12/2018 03:50 AM  
 Creatinine 0.85 10/12/2018 03:50 AM  
 WBC 7.5 10/12/2018 03:50 AM  
 Lactic Acid (POC) 0.7 10/05/2018 01:29 PM  
  
Estimated Creatinine Clearance: 102.2 mL/min (based on Cr of 0.85). Lab Results Component Value Date/Time Vancomycin,trough 17.4 10/12/2018 07:11 AM  
 
 
Day 6 of vancomycin. Goal trough is 15-20. Tr = 17.4. No changes, continue 1g q8h. Will continue to follow patient. Thank you, Marilia Veloz, Pharm. D. Clinical Pharmacist 
[phone]

## 2018-10-12 NOTE — PROGRESS NOTES
Problem: Falls - Risk of 
Goal: *Absence of Falls Document April Ankur Fall Risk and appropriate interventions in the flowsheet. Outcome: Progressing Towards Goal 
Fall Risk Interventions: 
  
 
  
 
Medication Interventions: Patient to call before getting OOB, Teach patient to arise slowly

## 2018-10-12 NOTE — PROGRESS NOTES
Infectious Disease Progress Note Today's Date: 10/12/2018 Admit Date: 10/5/2018 Impression: · MRSA, Bacillus species bacteremia (10/5); repeat BC (10/6) NGTD. TTE NG 
· Facial cellulitis/2.8 cm subcutaneous fluid collection at the right anterolateral aspect of the mandible resolving · Tobacco abuse · UDS (+) Opioids & Amphetamines but denies IVDU · Acute bilateral maculopapular inner thigh and lower flank rash; ?drug eruption - stable/improved · HCV Ab positive--VL 5.2 million Plan:  
· Continue IV vancomycin at this time. Discussed with pharmacy--Dalbavancin 1500mg IV x 1 dose can be given today or tomorrow at [facility] infusion center · Dispo: home after Dalbavancin administration · ID appt on 10/26 320PM  To evaluate facial abscess/cellulitis Anti-infectives: · IV Vancomycin (10/7- 
· IV Clindamycin (10/5-10/7) · IV Zosyn (10/7-10/8) Subjective:  
 
Less edema to chin along with less drainage. Updated on ID plans. Allergies Allergen Reactions  Penicillins Rash Review of Systems:  A comprehensive review of systems was negative except for that written in the History of Present Illness. Objective:  
 
Visit Vitals  /63  Pulse 64  Temp 98.1 °F (36.7 °C)  Resp 18  Ht 5' 6\" (1.676 m)  Wt 72.6 kg (160 lb)  SpO2 95%  BMI 25.82 kg/m2 Temp (24hrs), Av.3 °F (36.8 °C), Min:98 °F (36.7 °C), Max:98.7 °F (37.1 °C) Lines:  Peripheral IV:   
   
 
 
Physical Exam:   
General:  Alert, cooperative, in no distress Eyes:  Sclera anicteric Mouth/Throat: Mucous membranes normal, oral pharynx clear, poor dentition Neck: Supple Lungs:   Clear to auscultation bilaterally, good effort CV:  Regular rate and rhythm,no murmur, click, rub or gallop Abdomen:   Soft, non-tender. bowel sounds normal. non-distended Extremities: No cyanosis or edema Skin: Abscess site crusty, mild erythema and much less edema Lymph nodes: Musculoskeletal: No swelling or deformity Lines/Devices:  Intact, no erythema, drainage or tenderness Psych: Alert and oriented, normal mood affect given the setting Data Review: CBC: 
Recent Labs 10/12/18 
 0350 WBC  7.5 GRANS  53 MONOS  10 EOS  6 ANEU  4.0 ABL  2.3 HGB  14.6 HCT  42.7 PLT  323 BMP: 
Recent Labs 10/12/18 
 0350 CREA  0.85 BUN  15  
NA  141  
K  3.9 CL  107 CO2  26 AGAP  8  
GLU  95 LFTS: 
No results for input(s): TBILI, ALT, SGOT, AP, TP, ALB in the last 72 hours. Microbiology:  
 
All Micro Results Procedure Component Value Units Date/Time CULTURE, BLOOD [[id number]] Collected:  10/06/18 1017 Order Status:  Completed Specimen:  Blood from Blood Updated:  10/11/18 0805 Special Requests: --     
  RIGHT 
ARM Culture result: NO GROWTH 5 DAYS     
 CULTURE, BLOOD [[id number]]  (Abnormal) Collected:  10/05/18 1544 Order Status:  Completed Specimen:  Blood from Blood Updated:  10/11/18 2004 Special Requests: --     
  LEFT Antecubital 
  
  GRAM STAIN      
  GRAM POS COCCI IN CLUSTERS  
        
  AEROBIC AND ANAEROBIC BOTTLES RESULTS VERIFIED, PHONED TO AND READ BACK BY Debbie Meadows RN AT 9666 ON 10/6/18 SG  
   AEROBIC BOTTLE POSITIVE RESULTS VERIFIED, PHONED TO AND READ BACK BY DELILAH Pinon AT 8484 ON 10/10/2018 BY CAM  
  Culture result: STAPHYLOCOCCUS AUREUS (A) For Susceptibility Refer to Culture [address] [facility] [id number] CULTURE, BLOOD [[id number]]  (Abnormal)  (Susceptibility) Collected:  10/05/18 1545 Order Status:  Completed Specimen:  Blood from Blood Updated:  10/09/18 1005 Special Requests: --     
  RIGHT Antecubital 
  
  GRAM STAIN GRAM POSITIVE RODS ANAEROBIC BOTTLE POSITIVE   RESULTS VERIFIED, PHONED TO AND READ BACK BY [facility] RN AT 3759 ON 10/6/18 SG  
        
  GRAM POS COCCI IN CLUSTERS  
 AEROBIC BOTTLE POSITIVE RESULTS VERIFIED, PHONED TO AND READ BACK BY EMILY HARDIN @ 0409 ON [id number] BY Christy Gamez Culture result:      
  BACILLUS SPECIES ANAEROBIC BOTTLE POSITIVE THIS ORGANISM MAY BE INDICATIVE OF CULTURE CONTAMINATION, HOWEVER, CLINICAL CORRELATION NEEDS TO BE EVALUATED, AS EACH CASE IS UNIQUE. (A)  
        
  **METHICILLIN RESISTANT STAPHYLOCOCCUS AUREUS** (A) MRSA target DNA sequences detected:  SA target DNA sequence detected within the sample. Test performed by PCR   Culture/Sensitivity to follow (A) RESULTS VERIFIED, PHONED TO AND READ BACK BY 
Linden Montano ON 10/07/18 @0637, ADS RESULTS VERIFIED, PHONED TO AND READ BACK BY 
Ana Blanco RN AT 7565 10.9.18 CW Use Rifampin only in conjunction with another antimicrobial agent. Do not use as Monotherapy. Imaging: No new imaging Signed By: Jose Lunsford NP October 12, 2018

## 2018-10-13 VITALS
HEART RATE: 65 BPM | SYSTOLIC BLOOD PRESSURE: 127 MMHG | WEIGHT: 160 LBS | RESPIRATION RATE: 18 BRPM | TEMPERATURE: 97.8 F | OXYGEN SATURATION: 98 % | HEIGHT: 66 IN | BODY MASS INDEX: 25.71 KG/M2 | DIASTOLIC BLOOD PRESSURE: 68 MMHG

## 2018-10-13 PROCEDURE — 74011250637 HC RX REV CODE- 250/637: Performed by: INTERNAL MEDICINE

## 2018-10-13 PROCEDURE — 74011250636 HC RX REV CODE- 250/636: Performed by: INTERNAL MEDICINE

## 2018-10-13 RX ADMIN — VANCOMYCIN HYDROCHLORIDE 1000 MG: 1 INJECTION, POWDER, LYOPHILIZED, FOR SOLUTION INTRAVENOUS at 08:22

## 2018-10-13 RX ADMIN — MORPHINE SULFATE 2 MG: 2 INJECTION, SOLUTION INTRAMUSCULAR; INTRAVENOUS at 08:19

## 2018-10-13 RX ADMIN — HYDROCODONE BITARTRATE AND ACETAMINOPHEN 2 TABLET: 5; 325 TABLET ORAL at 03:58

## 2018-10-13 RX ADMIN — Medication 10 ML: at 05:54

## 2018-10-13 RX ADMIN — MORPHINE SULFATE 2 MG: 2 INJECTION, SOLUTION INTRAMUSCULAR; INTRAVENOUS at 00:30

## 2018-10-13 NOTE — PROGRESS NOTES
END OF SHIFT NOTE: 
 
INTAKE/OUTPUT 
10/12 0701 - 10/13 0700 In: -  
Out: 2750 [Urine:2750] Voiding: YES Catheter: NO 
Drain:   
 
 
 
 
 
Flatus: Patient does have flatus present. Stool:  0 occurrences. Characteristics: 
  
 
Emesis: 0 occurrences. Characteristics: VITAL SIGNS Patient Vitals for the past 12 hrs: 
 Temp Pulse Resp BP SpO2  
10/13/18 0330 97.7 °F (36.5 °C) 60 19 104/52 98 % 10/12/18 2300 98.1 °F (36.7 °C) 67 19 129/70 99 % 10/12/18 1913 98.1 °F (36.7 °C) 60 18 143/80 100 % Pain Assessment Pain Intensity 1: 0 (10/13/18 0432) Pain Location 1: Jaw Pain Intervention(s) 1: Medication (see MAR) Patient Stated Pain Goal: 0 Ambulating Yes Shift report given to oncoming nurse at the bedside.  
 
Jose Daniel Kirby RN
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
## 2018-10-13 NOTE — PROGRESS NOTES
Paged by nursing earlier that patient would need to be discharged and could then go to infusion center to get his dose of dalbavancin. Was later paged that patient had suddenly become upset and irritated but would not tell the nurse why. He informed her that he had to leave immediately. By the time I arrived to the floor the patient had left. He did sign his AMA form. He has a partially treated MRSA bacteremia secondary to a facial cellulitis/chin abscess. he had been on vancomycin. Given concerns for noncompliance and/or loss of follow up ID suggested a one time dose of dalbavancin prior to discharge. Because the patient left AMA he did not receive this infusion. I had discussed with him over the past few days that staph bacteremia requires IV antibiotics generally for at least 2 weeks and this treatment was very important to his health and overall recovery. Unfortunately the patient left AMA before I had a chance to see him today. I did sign his AMA papers.  
 
Zina Roman MD

## 2018-10-13 NOTE — PROGRESS NOTES
Pt was to discharge home with a referral to return to [facility] Out pt Infusion to receive the antibiotic Dalbavancin 1500mg IV as a one time dose to complete his treatment (this antibiotic is only approved for out pt administration so could not be given Inpt per pharmacy). Pt left the hospital AMA and LMSW confirmed with Infusion that pt did not present himself to infusion today to receive the antibiotic dose. LMSW spoke with rounding physician about pt leaving AMA and also left a message to update the Infectious Disease NP that pt left AMA and did not complete his treatment.

## 2018-10-27 ENCOUNTER — HOSPITAL ENCOUNTER (EMERGENCY)
Age: 43
Discharge: HOME | End: 2018-10-27
Payer: SUBSIDIZED

## 2018-10-27 VITALS
BODY MASS INDEX: 28.93 KG/M2 | HEART RATE: 82 BPM | TEMPERATURE: 98.1 F | WEIGHT: 180 LBS | HEIGHT: 66 IN | SYSTOLIC BLOOD PRESSURE: 130 MMHG | OXYGEN SATURATION: 97 % | RESPIRATION RATE: 20 BRPM | DIASTOLIC BLOOD PRESSURE: 74 MMHG

## 2018-10-27 DIAGNOSIS — R59.0 LYMPHADENOPATHY, AXILLARY: Primary | ICD-10-CM

## 2018-10-27 LAB
ALBUMIN SERPL-MCNC: 3.6 G/DL (ref 3.5–5)
ALBUMIN/GLOB SERPL: 0.9 {RATIO} (ref 1.2–3.5)
ALP SERPL-CCNC: 84 U/L (ref 50–136)
ALT SERPL-CCNC: 66 U/L (ref 12–65)
ANION GAP SERPL CALC-SCNC: 10 MMOL/L (ref 7–16)
AST SERPL-CCNC: 40 U/L (ref 15–37)
BASOPHILS # BLD: 0.1 K/UL (ref 0–0.2)
BASOPHILS NFR BLD: 1 % (ref 0–2)
BILIRUB SERPL-MCNC: 0.2 MG/DL (ref 0.2–1.1)
BUN SERPL-MCNC: 13 MG/DL (ref 6–23)
CALCIUM SERPL-MCNC: 8.8 MG/DL (ref 8.3–10.4)
CHLORIDE SERPL-SCNC: 110 MMOL/L (ref 98–107)
CO2 SERPL-SCNC: 25 MMOL/L (ref 21–32)
CREAT SERPL-MCNC: 0.92 MG/DL (ref 0.8–1.5)
DIFFERENTIAL METHOD BLD: NORMAL
EOSINOPHIL # BLD: 0.3 K/UL (ref 0–0.8)
EOSINOPHIL NFR BLD: 3 % (ref 0.5–7.8)
ERYTHROCYTE [DISTWIDTH] IN BLOOD BY AUTOMATED COUNT: 12.5 %
GLOBULIN SER CALC-MCNC: 4.1 G/DL (ref 2.3–3.5)
GLUCOSE SERPL-MCNC: 115 MG/DL (ref 65–100)
HCT VFR BLD AUTO: 42.4 % (ref 41.1–50.3)
HETEROPH AB SER QL: NEGATIVE
HGB BLD-MCNC: 14.2 G/DL (ref 13.6–17.2)
IMM GRANULOCYTES # BLD: 0 K/UL (ref 0–0.5)
IMM GRANULOCYTES NFR BLD AUTO: 1 % (ref 0–5)
LYMPHOCYTES # BLD: 3.2 K/UL (ref 0.5–4.6)
LYMPHOCYTES NFR BLD: 36 % (ref 13–44)
MCH RBC QN AUTO: 31.8 PG (ref 26.1–32.9)
MCHC RBC AUTO-ENTMCNC: 33.5 G/DL (ref 31.4–35)
MCV RBC AUTO: 95.1 FL (ref 79.6–97.8)
MONOCYTES # BLD: 0.7 K/UL (ref 0.1–1.3)
MONOCYTES NFR BLD: 8 % (ref 4–12)
NEUTS SEG # BLD: 4.5 K/UL (ref 1.7–8.2)
NEUTS SEG NFR BLD: 51 % (ref 43–78)
NRBC # BLD: 0 K/UL (ref 0–0.2)
PLATELET # BLD AUTO: 274 K/UL (ref 150–450)
PMV BLD AUTO: 10 FL (ref 9.4–12.3)
POTASSIUM SERPL-SCNC: 4.3 MMOL/L (ref 3.5–5.1)
PROT SERPL-MCNC: 7.7 G/DL (ref 6.3–8.2)
RBC # BLD AUTO: 4.46 M/UL (ref 4.23–5.6)
SODIUM SERPL-SCNC: 145 MMOL/L (ref 136–145)
WBC # BLD AUTO: 8.7 K/UL (ref 4.3–11.1)

## 2018-10-27 PROCEDURE — 85025 COMPLETE CBC W/AUTO DIFF WBC: CPT

## 2018-10-27 PROCEDURE — 86308 HETEROPHILE ANTIBODY SCREEN: CPT

## 2018-10-27 PROCEDURE — 80053 COMPREHEN METABOLIC PANEL: CPT

## 2018-10-27 PROCEDURE — 87040 BLOOD CULTURE FOR BACTERIA: CPT

## 2018-10-27 PROCEDURE — 99284 EMERGENCY DEPT VISIT MOD MDM: CPT

## 2018-10-27 NOTE — ED TRIAGE NOTES
Pt states he was here for a MRSA infection a couple of weeks ago. States he is coming back in today because he feels like the lymph nodes under his arms are swollen and sore. States he has been nauseated. Denies fevers.

## 2018-10-27 NOTE — ED PROVIDER NOTES
59-year-old man complaining of lymph nodes under both armpits. Patient received the hospital for MRSA on his chin what's to make sure he is not coming back. The history is provided by the patient. Skin Problem This is a recurrent problem. The current episode started yesterday. The problem has not changed since onset. The problem is associated with nothing. The rash is present on the left arm. The patient is experiencing no pain. He has tried nothing for the symptoms. No past medical history on file. Past Surgical History:  
Procedure Laterality Date  HX HEENT    
 tonsils No family history on file. Social History Socioeconomic History  Marital status: SINGLE Spouse name: Not on file  Number of children: Not on file  Years of education: Not on file  Highest education level: Not on file Social Needs  Financial resource strain: Not on file  Food insecurity - worry: Not on file  Food insecurity - inability: Not on file  Transportation needs - medical: Not on file  Transportation needs - non-medical: Not on file Occupational History  Not on file Tobacco Use  Smoking status: Former Smoker Packs/day: 1.00  Smokeless tobacco: Current User Substance and Sexual Activity  Alcohol use: No  
 Drug use: No  
 Sexual activity: Not on file Other Topics Concern  Not on file Social History Narrative  Not on file ALLERGIES: Penicillins Review of Systems Constitutional: Negative. Negative for activity change. HENT: Negative. Eyes: Negative. Respiratory: Negative. Cardiovascular: Negative. Gastrointestinal: Negative. Genitourinary: Negative. Musculoskeletal: Negative. Skin: Negative. Neurological: Negative. Psychiatric/Behavioral: Negative. All other systems reviewed and are negative. Vitals:  
 10/27/18 0435 BP: (!) 161/96 Pulse: 88 Resp: 18 Temp: 97.9 °F (36.6 °C) SpO2: 99% Weight: 81.6 kg (180 lb) Height: 5' 6\" (1.676 m) Physical Exam  
Constitutional: He is oriented to person, place, and time. He appears well-developed and well-nourished. No distress. HENT:  
Head: Normocephalic and atraumatic. Right Ear: External ear normal.  
Left Ear: External ear normal.  
Nose: Nose normal.  
Eyes: Conjunctivae and EOM are normal. Pupils are equal, round, and reactive to light. Right eye exhibits no discharge. Left eye exhibits no discharge. No scleral icterus. Neck: Normal range of motion. Cardiovascular: Regular rhythm. Pulmonary/Chest: Effort normal and breath sounds normal. No stridor. No respiratory distress. He has no wheezes. He has no rales. Abdominal: Soft. Bowel sounds are normal. He exhibits no distension. There is no tenderness. Musculoskeletal: Normal range of motion. Lymphadenopathy:  
     Right cervical: No superficial cervical adenopathy present. Left cervical: No superficial cervical adenopathy present. He has axillary adenopathy. Right axillary: Lateral adenopathy present. Left axillary: Lateral adenopathy present. All less than 2 cm lymph nodes in both axilla Neurological: He is alert and oriented to person, place, and time. He exhibits normal muscle tone. Coordination normal.  
Skin: Skin is warm and dry. No rash noted. Psychiatric: He has a normal mood and affect. His behavior is normal.  
  
 
MDM Number of Diagnoses or Management Options Diagnosis management comments: Patient did not want to wait for all his labs to come back in to work so we will call her with the abnormal labs and cultures. Patient is afebrile and has no other complaints. Procedures

## 2018-10-27 NOTE — DISCHARGE INSTRUCTIONS
Swollen Lymph Nodes: Care Instructions  Your Care Instructions    Lymph nodes are small, bean-shaped glands throughout the body. They help your body fight germs and infections. Lymph nodes often swell when there is a problem such as an injury, infection, or tumor. · The nodes in your neck, under your chin, or behind your ears may swell when you have a cold or sore throat. · An injury or infection in a leg or foot can make the nodes in your groin swell. · Sometimes medicine can make lymph nodes swell, but this is rare. Treatment depends on what caused your nodes to swell. Usually the nodes return to normal size without a problem. Follow-up care is a key part of your treatment and safety. Be sure to make and go to all appointments, and call your doctor if you are having problems. It's also a good idea to know your test results and keep a list of the medicines you take. How can you care for yourself at home? · Take your medicines exactly as prescribed. Call your doctor if you think you are having a problem with your medicine. · Avoid irritation. ? Do not squeeze or pick at the lump. ? Do not stick a needle in it. · Prevent infection. Do not squeeze, drain, or puncture a painful lump. Doing this can irritate or inflame the lump, push any existing infection deeper into the skin, or cause severe bleeding. · Get extra rest. Slow down just a little from your usual routine. · Drink plenty of fluids, enough so that your urine is light yellow or clear like water. If you have kidney, heart, or liver disease and have to limit fluids, talk with your doctor before you increase the amount of fluids you drink. · Take an over-the-counter pain medicine, such as acetaminophen (Tylenol), ibuprofen (Advil, Motrin), or naproxen (Aleve). Read and follow all instructions on the label. · Do not take two or more pain medicines at the same time unless the doctor told you to.  Many pain medicines have acetaminophen, which is Tylenol. Too much acetaminophen (Tylenol) can be harmful. When should you call for help? Call your doctor now or seek immediate medical care if:    · You have worse symptoms of infection, such as:  ? Increased pain, swelling, warmth, or redness. ? Red streaks leading from the area. ? Pus draining from the area. ? A fever.    Watch closely for changes in your health, and be sure to contact your doctor if:    · Your lymph nodes do not get smaller or do not return to normal.     · You do not get better as expected. Where can you learn more? Go to http://jama-norma.info/. Enter Z800 in the search box to learn more about \"Swollen Lymph Nodes: Care Instructions. \"  Current as of: November 18, 2017  Content Version: 11.8  © 3279-4787 [employer]. Care instructions adapted under license by [employer] (which disclaims liability or warranty for this information). If you have questions about a medical condition or this instruction, always ask your healthcare professional. [place] [address] any warranty or liability for your use of this information.

## 2018-10-27 NOTE — ROUTINE PROCESS
I have reviewed discharge instructions with the patient. The patient verbalized understanding. Patient left ED via Discharge Method: ambulatory to Home with self Opportunity for questions and clarification provided. Patient given 0 scripts. To continue your aftercare when you leave the hospital, you may receive an automated call from our care team to check in on how you are doing. This is a free service and part of our promise to provide the best care and service to meet your aftercare needs.  If you have questions, or wish to unsubscribe from this service please call [phone]. Thank you for Choosing our [facility] Emergency Department.

## 2018-11-01 LAB
BACTERIA SPEC CULT: NORMAL
SERVICE CMNT-IMP: NORMAL

## 2018-11-29 ENCOUNTER — HOSPITAL ENCOUNTER (EMERGENCY)
Age: 43
Discharge: HOME | End: 2018-11-30
Payer: SELF-PAY

## 2018-11-29 DIAGNOSIS — L03.211 CELLULITIS OF FACE: Primary | ICD-10-CM

## 2018-11-29 LAB
ALBUMIN SERPL-MCNC: 4.1 G/DL (ref 3.5–5)
ALBUMIN/GLOB SERPL: 1 {RATIO} (ref 1.2–3.5)
ALP SERPL-CCNC: 97 U/L (ref 50–136)
ALT SERPL-CCNC: 133 U/L (ref 12–65)
ANION GAP SERPL CALC-SCNC: 8 MMOL/L (ref 7–16)
AST SERPL-CCNC: 62 U/L (ref 15–37)
BASOPHILS # BLD: 0.1 K/UL (ref 0–0.2)
BASOPHILS NFR BLD: 0 % (ref 0–2)
BILIRUB SERPL-MCNC: 0.3 MG/DL (ref 0.2–1.1)
BUN SERPL-MCNC: 22 MG/DL (ref 6–23)
CALCIUM SERPL-MCNC: 8.9 MG/DL (ref 8.3–10.4)
CHLORIDE SERPL-SCNC: 106 MMOL/L (ref 98–107)
CO2 SERPL-SCNC: 26 MMOL/L (ref 21–32)
CREAT SERPL-MCNC: 1.44 MG/DL (ref 0.8–1.5)
DIFFERENTIAL METHOD BLD: ABNORMAL
EOSINOPHIL # BLD: 0.2 K/UL (ref 0–0.8)
EOSINOPHIL NFR BLD: 2 % (ref 0.5–7.8)
ERYTHROCYTE [DISTWIDTH] IN BLOOD BY AUTOMATED COUNT: 12.7 % (ref 11.9–14.6)
GLOBULIN SER CALC-MCNC: 4.1 G/DL (ref 2.3–3.5)
GLUCOSE SERPL-MCNC: 118 MG/DL (ref 65–100)
HCT VFR BLD AUTO: 44.7 % (ref 41.1–50.3)
HGB BLD-MCNC: 14.7 G/DL (ref 13.6–17.2)
IMM GRANULOCYTES # BLD: 0 K/UL (ref 0–0.5)
IMM GRANULOCYTES NFR BLD AUTO: 0 % (ref 0–5)
LIPASE SERPL-CCNC: 270 U/L (ref 73–393)
LYMPHOCYTES # BLD: 3.6 K/UL (ref 0.5–4.6)
LYMPHOCYTES NFR BLD: 27 % (ref 13–44)
MCH RBC QN AUTO: 31.1 PG (ref 26.1–32.9)
MCHC RBC AUTO-ENTMCNC: 32.9 G/DL (ref 31.4–35)
MCV RBC AUTO: 94.7 FL (ref 79.6–97.8)
MONOCYTES # BLD: 0.9 K/UL (ref 0.1–1.3)
MONOCYTES NFR BLD: 7 % (ref 4–12)
NEUTS SEG # BLD: 8.5 K/UL (ref 1.7–8.2)
NEUTS SEG NFR BLD: 64 % (ref 43–78)
NRBC # BLD: 0 K/UL (ref 0–0.2)
PLATELET # BLD AUTO: 268 K/UL (ref 150–450)
PMV BLD AUTO: 10.2 FL (ref 9.4–12.3)
POTASSIUM SERPL-SCNC: 3.8 MMOL/L (ref 3.5–5.1)
PROT SERPL-MCNC: 8.2 G/DL (ref 6.3–8.2)
RBC # BLD AUTO: 4.72 M/UL (ref 4.23–5.6)
SODIUM SERPL-SCNC: 140 MMOL/L (ref 136–145)
WBC # BLD AUTO: 13.4 K/UL (ref 4.3–11.1)

## 2018-11-29 PROCEDURE — 80053 COMPREHEN METABOLIC PANEL: CPT

## 2018-11-29 PROCEDURE — 83690 ASSAY OF LIPASE: CPT

## 2018-11-29 PROCEDURE — 85025 COMPLETE CBC W/AUTO DIFF WBC: CPT

## 2018-11-29 PROCEDURE — 99284 EMERGENCY DEPT VISIT MOD MDM: CPT

## 2018-11-29 RX ORDER — SODIUM CHLORIDE 0.9 % (FLUSH) 0.9 %
5-10 SYRINGE (ML) INJECTION EVERY 8 HOURS
Status: DISCONTINUED | OUTPATIENT
Start: 2018-11-29 | End: 2018-11-30 | Stop reason: HOSPADM

## 2018-11-29 RX ORDER — SODIUM CHLORIDE 9 MG/ML
1000 INJECTION, SOLUTION INTRAVENOUS ONCE
Status: COMPLETED | OUTPATIENT
Start: 2018-11-29 | End: 2018-11-30

## 2018-11-29 RX ORDER — SODIUM CHLORIDE 0.9 % (FLUSH) 0.9 %
5-10 SYRINGE (ML) INJECTION AS NEEDED
Status: DISCONTINUED | OUTPATIENT
Start: 2018-11-29 | End: 2018-11-30 | Stop reason: HOSPADM

## 2018-11-29 NOTE — LETTER
[address] [facility] EMERGENCY DEPT [address] [id number] 
[phone] Work/School Note Date: 11/29/2018 To Whom It May concern: 
 
Lexa Rao was seen and treated today in the emergency room by the following provider(s): 
Attending Provider: Melissa Valerio MD.   
 
Lexa Rao may return to work on 12/01/18. Sincerely, 
 
 
 
 
[place]

## 2018-11-30 VITALS
HEART RATE: 82 BPM | SYSTOLIC BLOOD PRESSURE: 132 MMHG | DIASTOLIC BLOOD PRESSURE: 75 MMHG | BODY MASS INDEX: 28.93 KG/M2 | RESPIRATION RATE: 16 BRPM | TEMPERATURE: 97.9 F | OXYGEN SATURATION: 99 % | HEIGHT: 66 IN | WEIGHT: 180 LBS

## 2018-11-30 PROCEDURE — 96376 TX/PRO/DX INJ SAME DRUG ADON: CPT

## 2018-11-30 PROCEDURE — 87040 BLOOD CULTURE FOR BACTERIA: CPT

## 2018-11-30 PROCEDURE — 96374 THER/PROPH/DIAG INJ IV PUSH: CPT

## 2018-11-30 PROCEDURE — 81003 URINALYSIS AUTO W/O SCOPE: CPT

## 2018-11-30 PROCEDURE — 74011250637 HC RX REV CODE- 250/637

## 2018-11-30 PROCEDURE — 74011250636 HC RX REV CODE- 250/636

## 2018-11-30 RX ORDER — SULFAMETHOXAZOLE AND TRIMETHOPRIM 800; 160 MG/1; MG/1
1 TABLET ORAL 2 TIMES DAILY
Qty: 14 TAB | Refills: 0 | Status: SHIPPED | OUTPATIENT
Start: 2018-11-30 | End: 2018-12-07

## 2018-11-30 RX ORDER — KETOROLAC TROMETHAMINE 30 MG/ML
30 INJECTION, SOLUTION INTRAMUSCULAR; INTRAVENOUS
Status: COMPLETED | OUTPATIENT
Start: 2018-11-30 | End: 2018-11-30

## 2018-11-30 RX ORDER — SULFAMETHOXAZOLE AND TRIMETHOPRIM 800; 160 MG/1; MG/1
1 TABLET ORAL
Status: COMPLETED | OUTPATIENT
Start: 2018-11-30 | End: 2018-11-30

## 2018-11-30 RX ORDER — ONDANSETRON 2 MG/ML
4 INJECTION INTRAMUSCULAR; INTRAVENOUS
Status: COMPLETED | OUTPATIENT
Start: 2018-11-30 | End: 2018-11-30

## 2018-11-30 RX ADMIN — SODIUM CHLORIDE 1000 ML: 900 INJECTION, SOLUTION INTRAVENOUS at 00:08

## 2018-11-30 RX ADMIN — SULFAMETHOXAZOLE AND TRIMETHOPRIM 1 TABLET: 800; 160 TABLET ORAL at 04:23

## 2018-11-30 RX ADMIN — ONDANSETRON 4 MG: 2 INJECTION INTRAMUSCULAR; INTRAVENOUS at 00:10

## 2018-11-30 RX ADMIN — KETOROLAC TROMETHAMINE 30 MG: 30 INJECTION, SOLUTION INTRAMUSCULAR at 00:10

## 2018-11-30 NOTE — ED PROVIDER NOTES
42-year-old male complaining of rash to face and nausea and abdominal pain. Patient states that he has had MRSA in the past requiring hospitalization and recently he's been having increasing redness of his face and nose. He states that he is just not felt well over the last few days. Rash This is a recurrent problem. The current episode started more than 2 days ago. The problem has not changed since onset. The rash is present on the face. The pain is at a severity of 10/10. The pain is severe. The pain has been constant since onset. Associated symptoms include pain. He has tried nothing for the symptoms. History reviewed. No pertinent past medical history. Past Surgical History:  
Procedure Laterality Date  HX HEENT    
 tonsils History reviewed. No pertinent family history. Social History Socioeconomic History  Marital status: SINGLE Spouse name: Not on file  Number of children: Not on file  Years of education: Not on file  Highest education level: Not on file Social Needs  Financial resource strain: Not on file  Food insecurity - worry: Not on file  Food insecurity - inability: Not on file  Transportation needs - medical: Not on file  Transportation needs - non-medical: Not on file Occupational History  Not on file Tobacco Use  Smoking status: Former Smoker Packs/day: 1.00  Smokeless tobacco: Current User Substance and Sexual Activity  Alcohol use: No  
 Drug use: Yes Types: Opiates, Methamphetamines, Heroin  Sexual activity: Not on file Other Topics Concern  Not on file Social History Narrative  Not on file ALLERGIES: Penicillins Review of Systems Constitutional: Negative. Negative for activity change. HENT: Negative. Eyes: Negative. Respiratory: Negative. Cardiovascular: Negative. Gastrointestinal: Negative. Genitourinary: Negative. Musculoskeletal: Negative. Skin: Positive for rash. Neurological: Negative. Psychiatric/Behavioral: Negative. All other systems reviewed and are negative. Vitals:  
 11/29/18 2116 BP: 131/66 Pulse: (!) 117 Resp: 16 Temp: 97.9 °F (36.6 °C) SpO2: 92% Weight: 81.6 kg (180 lb) Height: 5' 6\" (1.676 m) Physical Exam  
Constitutional: He is oriented to person, place, and time. He appears well-developed and well-nourished. No distress. HENT:  
Head: Normocephalic and atraumatic. Right Ear: External ear normal.  
Left Ear: External ear normal.  
Nose: Nose normal.  
Eyes: Conjunctivae and EOM are normal. Pupils are equal, round, and reactive to light. Right eye exhibits no discharge. Left eye exhibits no discharge. No scleral icterus. Neck: Normal range of motion. Cardiovascular: Regular rhythm. Pulmonary/Chest: Effort normal and breath sounds normal. No stridor. No respiratory distress. He has no wheezes. He has no rales. Abdominal: Soft. Bowel sounds are normal. He exhibits no distension. There is no tenderness. Musculoskeletal: Normal range of motion. Neurological: He is alert and oriented to person, place, and time. He exhibits normal muscle tone. Coordination normal.  
Skin: Skin is warm and dry. No rash noted. There is erythema. Psychiatric: He has a normal mood and affect. His behavior is normal.  
  
 
MDM Number of Diagnoses or Management Options Cellulitis of face:  
Diagnosis management comments: Assessment cellulitis of the face with a history of MRSA. Also nausea vomiting abdominal pain Amount and/or Complexity of Data Reviewed Clinical lab tests: reviewed and ordered Tests in the medicine section of CPT®: ordered Procedures

## 2018-11-30 NOTE — DISCHARGE INSTRUCTIONS

## 2018-12-05 LAB
BACTERIA SPEC CULT: NORMAL
BACTERIA SPEC CULT: NORMAL
SERVICE CMNT-IMP: NORMAL
SERVICE CMNT-IMP: NORMAL
